# Patient Record
Sex: MALE | Race: WHITE | NOT HISPANIC OR LATINO
[De-identification: names, ages, dates, MRNs, and addresses within clinical notes are randomized per-mention and may not be internally consistent; named-entity substitution may affect disease eponyms.]

---

## 2017-07-26 ENCOUNTER — LABORATORY RESULT (OUTPATIENT)
Age: 62
End: 2017-07-26

## 2017-07-26 ENCOUNTER — APPOINTMENT (OUTPATIENT)
Dept: INFECTIOUS DISEASE | Facility: CLINIC | Age: 62
End: 2017-07-26
Payer: COMMERCIAL

## 2017-07-26 VITALS
WEIGHT: 200 LBS | HEART RATE: 76 BPM | TEMPERATURE: 98.2 F | OXYGEN SATURATION: 97 % | HEIGHT: 71 IN | BODY MASS INDEX: 28 KG/M2 | SYSTOLIC BLOOD PRESSURE: 137 MMHG | DIASTOLIC BLOOD PRESSURE: 91 MMHG

## 2017-07-26 PROCEDURE — G0010: CPT

## 2017-07-26 PROCEDURE — 99215 OFFICE O/P EST HI 40 MIN: CPT | Mod: 25

## 2017-07-26 PROCEDURE — 90740 HEPB VACC 3 DOSE IMMUNSUP IM: CPT

## 2017-07-26 RX ORDER — ASPIRIN 81 MG
81 TABLET,CHEWABLE ORAL
Refills: 0 | Status: ACTIVE | COMMUNITY

## 2017-07-27 LAB
25(OH)D3 SERPL-MCNC: 28.4 NG/ML
ALBUMIN SERPL ELPH-MCNC: 4.7 G/DL
ALP BLD-CCNC: 81 U/L
ALT SERPL-CCNC: 20 U/L
ANION GAP SERPL CALC-SCNC: 15 MMOL/L
APPEARANCE: CLEAR
AST SERPL-CCNC: 21 U/L
BACTERIA: NEGATIVE
BASOPHILS # BLD AUTO: 0.02 K/UL
BASOPHILS NFR BLD AUTO: 0.2 %
BILIRUB SERPL-MCNC: 0.4 MG/DL
BILIRUBIN URINE: NEGATIVE
BLOOD URINE: NEGATIVE
BUN SERPL-MCNC: 18 MG/DL
CALCIUM SERPL-MCNC: 9.8 MG/DL
CHLORIDE SERPL-SCNC: 102 MMOL/L
CHOLEST SERPL-MCNC: 221 MG/DL
CHOLEST/HDLC SERPL: 4.6 RATIO
CO2 SERPL-SCNC: 25 MMOL/L
COLOR: YELLOW
CREAT SERPL-MCNC: 1.23 MG/DL
EOSINOPHIL # BLD AUTO: 0.11 K/UL
EOSINOPHIL NFR BLD AUTO: 1.4 %
GLUCOSE QUALITATIVE U: NORMAL MG/DL
GLUCOSE SERPL-MCNC: 97 MG/DL
HBA1C MFR BLD HPLC: 6 %
HCT VFR BLD CALC: 42.5 %
HCV AB SER QL: NONREACTIVE
HCV S/CO RATIO: 0.12 S/CO
HDLC SERPL-MCNC: 48 MG/DL
HGB BLD-MCNC: 13.9 G/DL
HYALINE CASTS: 1 /LPF
IMM GRANULOCYTES NFR BLD AUTO: 0.2 %
KETONES URINE: NEGATIVE
LDLC SERPL CALC-MCNC: 152 MG/DL
LEUKOCYTE ESTERASE URINE: NEGATIVE
LYMPHOCYTES # BLD AUTO: 2.52 K/UL
LYMPHOCYTES NFR BLD AUTO: 31.3 %
MAN DIFF?: NORMAL
MCHC RBC-ENTMCNC: 29.5 PG
MCHC RBC-ENTMCNC: 32.7 GM/DL
MCV RBC AUTO: 90.2 FL
MICROSCOPIC-UA: NORMAL
MONOCYTES # BLD AUTO: 0.71 K/UL
MONOCYTES NFR BLD AUTO: 8.8 %
NEUTROPHILS # BLD AUTO: 4.67 K/UL
NEUTROPHILS NFR BLD AUTO: 58.1 %
NITRITE URINE: NEGATIVE
PH URINE: 6
PLATELET # BLD AUTO: 251 K/UL
POTASSIUM SERPL-SCNC: 5.1 MMOL/L
PROT SERPL-MCNC: 7.7 G/DL
PROTEIN URINE: NEGATIVE MG/DL
PSA SERPL-MCNC: 0.62 NG/ML
RBC # BLD: 4.71 M/UL
RBC # FLD: 13.9 %
RED BLOOD CELLS URINE: 1 /HPF
SODIUM SERPL-SCNC: 142 MMOL/L
SPECIFIC GRAVITY URINE: 1.03
SQUAMOUS EPITHELIAL CELLS: 0 /HPF
TRIGL SERPL-MCNC: 104 MG/DL
UROBILINOGEN URINE: NORMAL MG/DL
WBC # FLD AUTO: 8.05 K/UL
WHITE BLOOD CELLS URINE: 1 /HPF

## 2017-07-28 LAB
ADJUSTED MITOGEN: >10 IU/ML
ADJUSTED TB AG: 0 IU/ML
C TRACH RRNA SPEC QL NAA+PROBE: NORMAL
CD3 CELLS # BLD: 2045 /UL
CD3 CELLS NFR BLD: 79 %
CD3+CD4+ CELLS # BLD: 823 /UL
CD3+CD4+ CELLS NFR BLD: 32 %
CD3+CD4+ CELLS/CD3+CD8+ CLL SPEC: 0.64 RATIO
CD3+CD8+ CELLS # SPEC: 1290 /UL
CD3+CD8+ CELLS NFR BLD: 50 %
HIV1 RNA # SERPL NAA+PROBE: 32
HIV1 RNA # SERPL NAA+PROBE: ABNORMAL
M TB IFN-G BLD-IMP: NEGATIVE
N GONORRHOEA RRNA SPEC QL NAA+PROBE: NORMAL
QUANTIFERON GOLD NIL: 0.03 IU/ML
RPR SER-TITR: NORMAL
SOURCE AMPLIFICATION: NORMAL
VIRAL LOAD INTERP: NORMAL
VIRAL LOAD LOG: 1.5

## 2017-07-29 LAB
TESTOST BND SERPL-MCNC: 6.7 PG/ML
TESTOST SERPL-MCNC: 591.4 NG/DL

## 2017-08-04 ENCOUNTER — LABORATORY RESULT (OUTPATIENT)
Age: 62
End: 2017-08-04

## 2017-08-04 ENCOUNTER — APPOINTMENT (OUTPATIENT)
Dept: INFECTIOUS DISEASE | Facility: CLINIC | Age: 62
End: 2017-08-04
Payer: COMMERCIAL

## 2017-08-04 VITALS
BODY MASS INDEX: 28 KG/M2 | DIASTOLIC BLOOD PRESSURE: 86 MMHG | HEIGHT: 71 IN | WEIGHT: 200 LBS | TEMPERATURE: 97.6 F | SYSTOLIC BLOOD PRESSURE: 136 MMHG | OXYGEN SATURATION: 96 % | HEART RATE: 87 BPM | RESPIRATION RATE: 16 BRPM

## 2017-08-04 PROCEDURE — 99214 OFFICE O/P EST MOD 30 MIN: CPT

## 2017-08-07 LAB
ALBUMIN SERPL ELPH-MCNC: 4.3 G/DL
ALP BLD-CCNC: 69 U/L
ALT SERPL-CCNC: 15 U/L
ANION GAP SERPL CALC-SCNC: 12 MMOL/L
APPEARANCE: CLEAR
AST SERPL-CCNC: 21 U/L
BACTERIA UR CULT: NORMAL
BACTERIA: NEGATIVE
BASOPHILS # BLD AUTO: 0.02 K/UL
BASOPHILS NFR BLD AUTO: 0.2 %
BILIRUB SERPL-MCNC: 0.2 MG/DL
BILIRUBIN URINE: NEGATIVE
BLOOD URINE: NEGATIVE
BUN SERPL-MCNC: 22 MG/DL
C TRACH RRNA SPEC QL NAA+PROBE: NORMAL
CALCIUM SERPL-MCNC: 9 MG/DL
CHLORIDE SERPL-SCNC: 103 MMOL/L
CO2 SERPL-SCNC: 25 MMOL/L
COLOR: YELLOW
CREAT SERPL-MCNC: 1.24 MG/DL
EOSINOPHIL # BLD AUTO: 0.14 K/UL
EOSINOPHIL NFR BLD AUTO: 1.6 %
GLUCOSE QUALITATIVE U: NORMAL MG/DL
GLUCOSE SERPL-MCNC: 101 MG/DL
HCT VFR BLD CALC: 39.3 %
HGB BLD-MCNC: 13 G/DL
HYALINE CASTS: 4 /LPF
IMM GRANULOCYTES NFR BLD AUTO: 0.1 %
KETONES URINE: NEGATIVE
LEUKOCYTE ESTERASE URINE: NEGATIVE
LYMPHOCYTES # BLD AUTO: 2.67 K/UL
LYMPHOCYTES NFR BLD AUTO: 31.4 %
MAN DIFF?: NORMAL
MCHC RBC-ENTMCNC: 29.6 PG
MCHC RBC-ENTMCNC: 33.1 GM/DL
MCV RBC AUTO: 89.5 FL
MICROSCOPIC-UA: NORMAL
MONOCYTES # BLD AUTO: 0.72 K/UL
MONOCYTES NFR BLD AUTO: 8.5 %
N GONORRHOEA RRNA SPEC QL NAA+PROBE: NORMAL
NEUTROPHILS # BLD AUTO: 4.93 K/UL
NEUTROPHILS NFR BLD AUTO: 58.2 %
NITRITE URINE: NEGATIVE
PH URINE: 5.5
PLATELET # BLD AUTO: 252 K/UL
POTASSIUM SERPL-SCNC: 4.4 MMOL/L
PROT SERPL-MCNC: 7.7 G/DL
PROTEIN URINE: NEGATIVE MG/DL
RBC # BLD: 4.39 M/UL
RBC # FLD: 13.9 %
RED BLOOD CELLS URINE: 2 /HPF
SODIUM SERPL-SCNC: 140 MMOL/L
SOURCE AMPLIFICATION: NORMAL
SPECIFIC GRAVITY URINE: 1.03
SQUAMOUS EPITHELIAL CELLS: 2 /HPF
T PALLIDUM AB SER QL IA: POSITIVE
UROBILINOGEN URINE: NORMAL MG/DL
WBC # FLD AUTO: 8.49 K/UL
WHITE BLOOD CELLS URINE: 2 /HPF

## 2018-06-19 ENCOUNTER — APPOINTMENT (OUTPATIENT)
Dept: INFECTIOUS DISEASE | Facility: CLINIC | Age: 63
End: 2018-06-19

## 2018-06-19 ENCOUNTER — LABORATORY RESULT (OUTPATIENT)
Age: 63
End: 2018-06-19

## 2018-06-19 LAB
25(OH)D3 SERPL-MCNC: 28.9 NG/ML
ALBUMIN SERPL ELPH-MCNC: 3.9 G/DL
ALP BLD-CCNC: 62 U/L
ALT SERPL-CCNC: 19 U/L
ANION GAP SERPL CALC-SCNC: 14 MMOL/L
APPEARANCE: CLEAR
AST SERPL-CCNC: 23 U/L
BACTERIA: NEGATIVE
BASOPHILS # BLD AUTO: 0.02 K/UL
BASOPHILS NFR BLD AUTO: 0.3 %
BILIRUB SERPL-MCNC: 0.3 MG/DL
BILIRUBIN URINE: NEGATIVE
BLOOD URINE: NEGATIVE
BUN SERPL-MCNC: 19 MG/DL
CALCIUM SERPL-MCNC: 9.2 MG/DL
CD3 CELLS # BLD: 2226 /UL
CD3 CELLS NFR BLD: 77 %
CD3+CD4+ CELLS # BLD: 866 /UL
CD3+CD4+ CELLS NFR BLD: 30 %
CD3+CD4+ CELLS/CD3+CD8+ CLL SPEC: 0.61 RATIO
CD3+CD8+ CELLS # SPEC: 1428 /UL
CD3+CD8+ CELLS NFR BLD: 50 %
CHLORIDE SERPL-SCNC: 105 MMOL/L
CHOLEST SERPL-MCNC: 174 MG/DL
CHOLEST/HDLC SERPL: 4.2 RATIO
CO2 SERPL-SCNC: 24 MMOL/L
COLOR: YELLOW
CREAT SERPL-MCNC: 1.34 MG/DL
CREAT SPEC-SCNC: 86 MG/DL
CREAT/PROT UR: 0 RATIO
EOSINOPHIL # BLD AUTO: 0.1 K/UL
EOSINOPHIL NFR BLD AUTO: 1.5 %
GLUCOSE QUALITATIVE U: NEGATIVE MG/DL
GLUCOSE SERPL-MCNC: 121 MG/DL
HBA1C MFR BLD HPLC: 6.3 %
HCT VFR BLD CALC: 37.7 %
HDLC SERPL-MCNC: 41 MG/DL
HGB BLD-MCNC: 12.3 G/DL
IMM GRANULOCYTES NFR BLD AUTO: 0.1 %
KETONES URINE: NEGATIVE
LDLC SERPL CALC-MCNC: 118 MG/DL
LEUKOCYTE ESTERASE URINE: NEGATIVE
LYMPHOCYTES # BLD AUTO: 2.6 K/UL
LYMPHOCYTES NFR BLD AUTO: 38.6 %
MAN DIFF?: NORMAL
MCHC RBC-ENTMCNC: 29.4 PG
MCHC RBC-ENTMCNC: 32.6 GM/DL
MCV RBC AUTO: 90 FL
MICROSCOPIC-UA: NORMAL
MONOCYTES # BLD AUTO: 0.62 K/UL
MONOCYTES NFR BLD AUTO: 9.2 %
NEUTROPHILS # BLD AUTO: 3.38 K/UL
NEUTROPHILS NFR BLD AUTO: 50.3 %
NITRITE URINE: NEGATIVE
PH URINE: 7
PHOSPHATE SERPL-MCNC: 2.7 MG/DL
PLATELET # BLD AUTO: 231 K/UL
POTASSIUM SERPL-SCNC: 4.5 MMOL/L
PROT SERPL-MCNC: 6.8 G/DL
PROT UR-MCNC: 4 MG/DL
PROTEIN URINE: NEGATIVE MG/DL
PSA SERPL-MCNC: 0.43 NG/ML
RBC # BLD: 4.19 M/UL
RBC # FLD: 13.8 %
RED BLOOD CELLS URINE: 1 /HPF
SODIUM SERPL-SCNC: 143 MMOL/L
SPECIFIC GRAVITY URINE: 1.02
SQUAMOUS EPITHELIAL CELLS: 0 /HPF
TRIGL SERPL-MCNC: 74 MG/DL
UROBILINOGEN URINE: NEGATIVE MG/DL
WBC # FLD AUTO: 6.73 K/UL
WHITE BLOOD CELLS URINE: 0 /HPF

## 2018-06-20 LAB
C TRACH RRNA SPEC QL NAA+PROBE: NOT DETECTED
HBV SURFACE AB SER QL: REACTIVE
HCV AB SER QL: NONREACTIVE
HCV S/CO RATIO: 0.15 S/CO
HIV1 RNA # SERPL NAA+PROBE: 111
HIV1 RNA # SERPL NAA+PROBE: ABNORMAL
N GONORRHOEA RRNA SPEC QL NAA+PROBE: NOT DETECTED
SOURCE AMPLIFICATION: NORMAL
VIRAL LOAD INTERP: NORMAL
VIRAL LOAD LOG: 2.05

## 2018-06-21 LAB
ADJUSTED MITOGEN: 9.45 IU/ML
ADJUSTED TB AG: 0.02 IU/ML
M TB IFN-G BLD-IMP: NEGATIVE
QUANTIFERON GOLD NIL: 0.03 IU/ML

## 2018-06-22 LAB
T PALLIDUM AB SER QL IA: POSITIVE
TESTOST BND SERPL-MCNC: 6.5 PG/ML
TESTOST SERPL-MCNC: 415.4 NG/DL

## 2018-06-27 ENCOUNTER — APPOINTMENT (OUTPATIENT)
Dept: INFECTIOUS DISEASE | Facility: CLINIC | Age: 63
End: 2018-06-27
Payer: COMMERCIAL

## 2018-06-27 VITALS
DIASTOLIC BLOOD PRESSURE: 78 MMHG | TEMPERATURE: 97.6 F | OXYGEN SATURATION: 96 % | WEIGHT: 206 LBS | HEIGHT: 71 IN | SYSTOLIC BLOOD PRESSURE: 124 MMHG | HEART RATE: 82 BPM | BODY MASS INDEX: 28.84 KG/M2

## 2018-06-27 PROCEDURE — 99215 OFFICE O/P EST HI 40 MIN: CPT

## 2018-06-28 LAB
FOLATE SERPL-MCNC: 5.4 NG/ML
VIT B12 SERPL-MCNC: 626 PG/ML

## 2018-07-02 LAB
ABACAVIR ISLT GENOTYP: NORMAL
ATAZANAVIR+RITONAVIR ISLT GENOTYP: NORMAL
DARUNAVIR+RITONAVIR ISLT GENOTYP: NORMAL
DIDANOSINE ISLT GENOTYP: NORMAL
EFAVIRENZ ISLT GENOTYP: NORMAL
EMTRICITABINE ISLT GENOTYP: NORMAL
ETRAVIRINE ISLT GENOTYP: NORMAL
FOSAMPRENAVIR+RITONAVIR ISLT GENOTYP: NORMAL
HIV NNRTI GENE MUT DET ISLT: NORMAL
HIV NRTI GENE MUT DET ISLT: NORMAL
HIV PI GENE MUT DET ISLT: NORMAL
HIV RT GENE MUT DET ISLT: NORMAL
INDINAVIR+RITONAVIR ISLT GENOTYP: NORMAL
LAMIVUDINE ISLT GENOTYP: NORMAL
LOPINAVIR+RITONAVIR ISLT GENOTYP: NORMAL
NELFINAVIR ISLT GENOTYP: NORMAL
NEVIRAPINE ISLT GENOTYP: NORMAL
RILPIVIRINE ISLT GENOTYP: NORMAL
SAQUINAVIR+RITONAVIR ISLT GENOTYP: NORMAL
STAVUDINE ISLT GENOTYP: NORMAL
TENOFOVIR ISLT GENOTYP: NORMAL
TIPRANAVIR+RITONAVIR ISLT GENOTYP: NORMAL
ZIDOVUDINE ISLT GENOTYP: NORMAL

## 2018-07-10 LAB — HLA-B*57:01 QL: NEGATIVE

## 2018-07-18 LAB
MISCELLANEOUS TEST: NORMAL
PROC NAME: NORMAL

## 2018-07-25 ENCOUNTER — APPOINTMENT (OUTPATIENT)
Dept: INFECTIOUS DISEASE | Facility: CLINIC | Age: 63
End: 2018-07-25
Payer: COMMERCIAL

## 2018-07-25 VITALS
BODY MASS INDEX: 28.7 KG/M2 | DIASTOLIC BLOOD PRESSURE: 73 MMHG | TEMPERATURE: 97.2 F | HEIGHT: 71 IN | OXYGEN SATURATION: 97 % | SYSTOLIC BLOOD PRESSURE: 116 MMHG | HEART RATE: 90 BPM | WEIGHT: 205 LBS

## 2018-07-25 PROCEDURE — 99214 OFFICE O/P EST MOD 30 MIN: CPT

## 2018-07-27 LAB
ALBUMIN SERPL ELPH-MCNC: 4 G/DL
ALP BLD-CCNC: 60 U/L
ALT SERPL-CCNC: 15 U/L
AMYLASE/CREAT SERPL: 104 U/L
ANION GAP SERPL CALC-SCNC: 13 MMOL/L
AST SERPL-CCNC: 26 U/L
BASOPHILS # BLD AUTO: 0.03 K/UL
BASOPHILS NFR BLD AUTO: 0.4 %
BILIRUB SERPL-MCNC: 0.2 MG/DL
BUN SERPL-MCNC: 19 MG/DL
CALCIUM SERPL-MCNC: 9.2 MG/DL
CHLORIDE SERPL-SCNC: 103 MMOL/L
CO2 SERPL-SCNC: 26 MMOL/L
CREAT SERPL-MCNC: 1.13 MG/DL
EOSINOPHIL # BLD AUTO: 0.13 K/UL
EOSINOPHIL NFR BLD AUTO: 1.8 %
GLUCOSE SERPL-MCNC: 74 MG/DL
HCT VFR BLD CALC: 39 %
HGB BLD-MCNC: 12.5 G/DL
IMM GRANULOCYTES NFR BLD AUTO: 0.1 %
LPL SERPL-CCNC: 58 U/L
LYMPHOCYTES # BLD AUTO: 2.35 K/UL
LYMPHOCYTES NFR BLD AUTO: 33.3 %
MAN DIFF?: NORMAL
MCHC RBC-ENTMCNC: 29.2 PG
MCHC RBC-ENTMCNC: 32.1 GM/DL
MCV RBC AUTO: 91.1 FL
MONOCYTES # BLD AUTO: 0.58 K/UL
MONOCYTES NFR BLD AUTO: 8.2 %
NEUTROPHILS # BLD AUTO: 3.96 K/UL
NEUTROPHILS NFR BLD AUTO: 56.2 %
PLATELET # BLD AUTO: 248 K/UL
POTASSIUM SERPL-SCNC: 4.3 MMOL/L
PROT SERPL-MCNC: 7 G/DL
RBC # BLD: 4.28 M/UL
RBC # FLD: 13.8 %
SODIUM SERPL-SCNC: 142 MMOL/L
WBC # FLD AUTO: 7.06 K/UL

## 2018-08-02 LAB
DOLUTEGRAVIR RESISTANCE: NORMAL
ELVITEGRAVIR RESISTANCE: NORMAL
RALTEGRAVIR RESISTANCE: NORMAL

## 2018-08-06 LAB
HIV GENOSURE ARCHIVE 1: NORMAL
HIV1 PROVIR DNA RT + PR + IN MUT DET SEQ: NORMAL
HIV1 PROVIRAL DNA GENTYP BLD MC NAR: NORMAL

## 2018-08-14 ENCOUNTER — APPOINTMENT (OUTPATIENT)
Dept: UROLOGY | Facility: CLINIC | Age: 63
End: 2018-08-14
Payer: COMMERCIAL

## 2018-08-14 VITALS
SYSTOLIC BLOOD PRESSURE: 105 MMHG | HEIGHT: 71 IN | TEMPERATURE: 98.3 F | BODY MASS INDEX: 28.56 KG/M2 | DIASTOLIC BLOOD PRESSURE: 61 MMHG | HEART RATE: 43 BPM | WEIGHT: 204 LBS

## 2018-08-14 PROCEDURE — 99204 OFFICE O/P NEW MOD 45 MIN: CPT

## 2018-08-15 ENCOUNTER — MEDICATION RENEWAL (OUTPATIENT)
Age: 63
End: 2018-08-15

## 2018-09-24 ENCOUNTER — APPOINTMENT (OUTPATIENT)
Dept: INFECTIOUS DISEASE | Facility: CLINIC | Age: 63
End: 2018-09-24

## 2018-10-24 ENCOUNTER — LABORATORY RESULT (OUTPATIENT)
Age: 63
End: 2018-10-24

## 2018-10-24 ENCOUNTER — APPOINTMENT (OUTPATIENT)
Dept: INFECTIOUS DISEASE | Facility: CLINIC | Age: 63
End: 2018-10-24
Payer: COMMERCIAL

## 2018-10-24 VITALS
TEMPERATURE: 97.7 F | WEIGHT: 204 LBS | SYSTOLIC BLOOD PRESSURE: 125 MMHG | BODY MASS INDEX: 28.45 KG/M2 | HEART RATE: 94 BPM | DIASTOLIC BLOOD PRESSURE: 82 MMHG | OXYGEN SATURATION: 94 %

## 2018-10-24 LAB
25(OH)D3 SERPL-MCNC: 26 NG/ML
ALBUMIN SERPL ELPH-MCNC: 4.6 G/DL
ALP BLD-CCNC: 83 U/L
ALT SERPL-CCNC: 17 U/L
ANION GAP SERPL CALC-SCNC: 13 MMOL/L
APPEARANCE: CLEAR
AST SERPL-CCNC: 18 U/L
BACTERIA: NEGATIVE
BASOPHILS # BLD AUTO: 0.02 K/UL
BASOPHILS NFR BLD AUTO: 0.2 %
BILIRUB SERPL-MCNC: 0.3 MG/DL
BILIRUBIN URINE: NEGATIVE
BLOOD URINE: NEGATIVE
BUN SERPL-MCNC: 16 MG/DL
CALCIUM SERPL-MCNC: 9.6 MG/DL
CD3 CELLS # BLD: 2072 /UL
CD3 CELLS NFR BLD: 75 %
CD3+CD4+ CELLS # BLD: 901 /UL
CD3+CD4+ CELLS NFR BLD: 33 %
CD3+CD4+ CELLS/CD3+CD8+ CLL SPEC: 0.78 RATIO
CD3+CD8+ CELLS # SPEC: 1155 /UL
CD3+CD8+ CELLS NFR BLD: 42 %
CHLORIDE SERPL-SCNC: 104 MMOL/L
CHOLEST SERPL-MCNC: 205 MG/DL
CHOLEST/HDLC SERPL: 4.2 RATIO
CO2 SERPL-SCNC: 26 MMOL/L
COLOR: YELLOW
CREAT SERPL-MCNC: 1.28 MG/DL
EOSINOPHIL # BLD AUTO: 0.13 K/UL
EOSINOPHIL NFR BLD AUTO: 1.5 %
GLUCOSE QUALITATIVE U: NEGATIVE MG/DL
GLUCOSE SERPL-MCNC: 96 MG/DL
HBA1C MFR BLD HPLC: 6.2 %
HCT VFR BLD CALC: 40.5 %
HDLC SERPL-MCNC: 49 MG/DL
HGB BLD-MCNC: 13.2 G/DL
HYALINE CASTS: 0 /LPF
IMM GRANULOCYTES NFR BLD AUTO: 0.2 %
KETONES URINE: NEGATIVE
LDLC SERPL CALC-MCNC: 146 MG/DL
LEUKOCYTE ESTERASE URINE: NEGATIVE
LYMPHOCYTES # BLD AUTO: 2.67 K/UL
LYMPHOCYTES NFR BLD AUTO: 30.5 %
MAN DIFF?: NORMAL
MCHC RBC-ENTMCNC: 28.8 PG
MCHC RBC-ENTMCNC: 32.6 GM/DL
MCV RBC AUTO: 88.4 FL
MICROSCOPIC-UA: NORMAL
MONOCYTES # BLD AUTO: 0.8 K/UL
MONOCYTES NFR BLD AUTO: 9.1 %
NEUTROPHILS # BLD AUTO: 5.11 K/UL
NEUTROPHILS NFR BLD AUTO: 58.5 %
NITRITE URINE: NEGATIVE
PH URINE: 5
PLATELET # BLD AUTO: 266 K/UL
POTASSIUM SERPL-SCNC: 4.2 MMOL/L
PROT SERPL-MCNC: 7.7 G/DL
PROTEIN URINE: NEGATIVE MG/DL
RBC # BLD: 4.58 M/UL
RBC # FLD: 14 %
RED BLOOD CELLS URINE: 1 /HPF
SODIUM SERPL-SCNC: 143 MMOL/L
SPECIFIC GRAVITY URINE: 1.03
SQUAMOUS EPITHELIAL CELLS: 0 /HPF
TRIGL SERPL-MCNC: 51 MG/DL
UROBILINOGEN URINE: NEGATIVE MG/DL
WBC # FLD AUTO: 8.75 K/UL
WHITE BLOOD CELLS URINE: 1 /HPF

## 2018-10-24 PROCEDURE — 99214 OFFICE O/P EST MOD 30 MIN: CPT

## 2018-10-26 LAB
C TRACH RRNA SPEC QL NAA+PROBE: NOT DETECTED
HIV1 RNA # SERPL NAA+PROBE: 170
HIV1 RNA # SERPL NAA+PROBE: ABNORMAL
M TB IFN-G BLD-IMP: NEGATIVE
N GONORRHOEA RRNA SPEC QL NAA+PROBE: NOT DETECTED
QUANTIFERON TB PLUS MITOGEN MINUS NIL: >10 IU/ML
QUANTIFERON TB PLUS NIL: 0.06 IU/ML
QUANTIFERON TB PLUS TB1 MINUS NIL: -0.02 IU/ML
QUANTIFERON TB PLUS TB2 MINUS NIL: -0.02 IU/ML
SOURCE AMPLIFICATION: NORMAL
VIRAL LOAD INTERP: NORMAL
VIRAL LOAD LOG: 2.23

## 2018-10-27 LAB — T PALLIDUM AB SER QL IA: POSITIVE

## 2018-10-30 LAB — E HISTOLYT AB SER-ACNC: NEGATIVE

## 2019-01-11 ENCOUNTER — APPOINTMENT (OUTPATIENT)
Dept: UROLOGY | Facility: CLINIC | Age: 64
End: 2019-01-11

## 2019-10-14 ENCOUNTER — APPOINTMENT (OUTPATIENT)
Dept: INFECTIOUS DISEASE | Facility: CLINIC | Age: 64
End: 2019-10-14

## 2019-10-14 ENCOUNTER — LABORATORY RESULT (OUTPATIENT)
Age: 64
End: 2019-10-14

## 2019-10-17 LAB
25(OH)D3 SERPL-MCNC: 23.3 NG/ML
ALBUMIN SERPL ELPH-MCNC: 4.2 G/DL
ALP BLD-CCNC: 70 U/L
ALT SERPL-CCNC: 11 U/L
ANION GAP SERPL CALC-SCNC: 12 MMOL/L
APPEARANCE: ABNORMAL
AST SERPL-CCNC: 17 U/L
BACTERIA: NEGATIVE
BASOPHILS # BLD AUTO: 0.05 K/UL
BASOPHILS NFR BLD AUTO: 0.8 %
BILIRUB SERPL-MCNC: 0.4 MG/DL
BILIRUBIN URINE: NEGATIVE
BLOOD URINE: NEGATIVE
BUN SERPL-MCNC: 15 MG/DL
C TRACH RRNA SPEC QL NAA+PROBE: NOT DETECTED
CALCIUM SERPL-MCNC: 9.1 MG/DL
CD3 CELLS # BLD: 1475 /UL
CD3 CELLS NFR BLD: 79 %
CD3+CD4+ CELLS # BLD: 603 /UL
CD3+CD4+ CELLS NFR BLD: 32 %
CD3+CD4+ CELLS/CD3+CD8+ CLL SPEC: 0.7 RATIO
CD3+CD8+ CELLS # SPEC: 860 /UL
CD3+CD8+ CELLS NFR BLD: 46 %
CHLORIDE SERPL-SCNC: 106 MMOL/L
CHOLEST SERPL-MCNC: 188 MG/DL
CHOLEST/HDLC SERPL: 4.7 RATIO
CO2 SERPL-SCNC: 23 MMOL/L
COLOR: NORMAL
CREAT SERPL-MCNC: 1.28 MG/DL
CREAT SPEC-SCNC: 114 MG/DL
CREAT/PROT UR: 0.1 RATIO
EOSINOPHIL # BLD AUTO: 0.15 K/UL
EOSINOPHIL NFR BLD AUTO: 2.4 %
ESTIMATED AVERAGE GLUCOSE: 131 MG/DL
GLUCOSE QUALITATIVE U: NEGATIVE
GLUCOSE SERPL-MCNC: 103 MG/DL
HBA1C MFR BLD HPLC: 6.2 %
HBV SURFACE AB SER QL: REACTIVE
HCT VFR BLD CALC: 39.7 %
HCV RNA SERPL NAA DL=5-ACNC: NOT DETECTED IU/ML
HCV RNA SERPL NAA+PROBE-LOG IU: NOT DETECTED LOGIU/ML
HDLC SERPL-MCNC: 40 MG/DL
HGB BLD-MCNC: 12.8 G/DL
HIV1 RNA # SERPL NAA+PROBE: 56
HIV1 RNA # SERPL NAA+PROBE: ABNORMAL
HYALINE CASTS: 0 /LPF
IMM GRANULOCYTES NFR BLD AUTO: 0 %
KETONES URINE: NEGATIVE
LDLC SERPL CALC-MCNC: 130 MG/DL
LEUKOCYTE ESTERASE URINE: NEGATIVE
LYMPHOCYTES # BLD AUTO: 2.02 K/UL
LYMPHOCYTES NFR BLD AUTO: 32.2 %
M TB IFN-G BLD-IMP: NEGATIVE
MAN DIFF?: NORMAL
MCHC RBC-ENTMCNC: 30.4 PG
MCHC RBC-ENTMCNC: 32.2 GM/DL
MCV RBC AUTO: 94.3 FL
MICROSCOPIC-UA: NORMAL
MONOCYTES # BLD AUTO: 0.55 K/UL
MONOCYTES NFR BLD AUTO: 8.8 %
N GONORRHOEA RRNA SPEC QL NAA+PROBE: NOT DETECTED
NEUTROPHILS # BLD AUTO: 3.51 K/UL
NEUTROPHILS NFR BLD AUTO: 55.8 %
NITRITE URINE: NEGATIVE
PH URINE: 8
PHOSPHATE SERPL-MCNC: 2.8 MG/DL
PLATELET # BLD AUTO: 230 K/UL
POTASSIUM SERPL-SCNC: 4.5 MMOL/L
PROT SERPL-MCNC: 6.5 G/DL
PROT UR-MCNC: 6 MG/DL
PROTEIN URINE: NORMAL
PSA SERPL-MCNC: 0.6 NG/ML
QUANTIFERON TB PLUS MITOGEN MINUS NIL: >10 IU/ML
QUANTIFERON TB PLUS NIL: 0.02 IU/ML
QUANTIFERON TB PLUS TB1 MINUS NIL: 0 IU/ML
QUANTIFERON TB PLUS TB2 MINUS NIL: 0 IU/ML
RBC # BLD: 4.21 M/UL
RBC # FLD: 13.2 %
RED BLOOD CELLS URINE: 1 /HPF
SODIUM SERPL-SCNC: 141 MMOL/L
SOURCE AMPLIFICATION: NORMAL
SPECIFIC GRAVITY URINE: 1.02
SQUAMOUS EPITHELIAL CELLS: 0 /HPF
T PALLIDUM AB SER QL IA: POSITIVE
TESTOST BND SERPL-MCNC: 6.2 PG/ML
TESTOST SERPL-MCNC: 415.2 NG/DL
TRIGL SERPL-MCNC: 90 MG/DL
UROBILINOGEN URINE: NORMAL
VIRAL LOAD INTERP: NORMAL
VIRAL LOAD LOG: 1.75
WBC # FLD AUTO: 6.28 K/UL
WHITE BLOOD CELLS URINE: 0 /HPF

## 2019-10-22 ENCOUNTER — FORM ENCOUNTER (OUTPATIENT)
Age: 64
End: 2019-10-22

## 2019-10-23 ENCOUNTER — APPOINTMENT (OUTPATIENT)
Dept: INFECTIOUS DISEASE | Facility: CLINIC | Age: 64
End: 2019-10-23
Payer: MEDICARE

## 2019-10-23 VITALS
HEIGHT: 71 IN | BODY MASS INDEX: 28.28 KG/M2 | DIASTOLIC BLOOD PRESSURE: 79 MMHG | WEIGHT: 202 LBS | RESPIRATION RATE: 17 BRPM | HEART RATE: 89 BPM | TEMPERATURE: 97.1 F | OXYGEN SATURATION: 97 % | SYSTOLIC BLOOD PRESSURE: 118 MMHG

## 2019-10-23 DIAGNOSIS — L03.113 CELLULITIS OF RIGHT UPPER LIMB: ICD-10-CM

## 2019-10-23 DIAGNOSIS — K52.9 NONINFECTIVE GASTROENTERITIS AND COLITIS, UNSPECIFIED: ICD-10-CM

## 2019-10-23 PROCEDURE — 99215 OFFICE O/P EST HI 40 MIN: CPT

## 2019-10-25 LAB
HIV1 RNA # SERPL NAA+PROBE: 95
HIV1 RNA # SERPL NAA+PROBE: ABNORMAL
VIRAL LOAD INTERP: NORMAL
VIRAL LOAD LOG: 1.98

## 2019-10-27 PROBLEM — L03.113 CELLULITIS OF RIGHT UPPER EXTREMITY: Status: RESOLVED | Noted: 2018-10-27 | Resolved: 2019-10-27

## 2019-10-27 PROBLEM — K52.9 COLITIS, ACUTE: Status: RESOLVED | Noted: 2017-07-29 | Resolved: 2019-10-27

## 2019-11-02 NOTE — ADDENDUM
[FreeTextEntry1] : Reviewing patients record I see that he needs the flu shot and pneumovax\par I will have nurse call to check to see if he got it at the local pharmacy.\par will also need shingrix at some point but won't give together with other shts

## 2019-11-02 NOTE — REVIEW OF SYSTEMS
[Negative] : Heme/Lymph [FreeTextEntry2] : outside doctor gives patient Bactrim pills for when he has  a staph infection [FreeTextEntry4] : last eye exam- a long time ago   dentist - regularly [FreeTextEntry7] : last colonoscopy a  few years ago.. hemorrhoids  [de-identified] : claims missed none

## 2019-11-02 NOTE — ASSESSMENT
[FreeTextEntry1] : 63 yo male here for follow up- after hiatus\par \par \par Pt is not compliant with his prostate meds and was not with his diabetes meds. I suspect his HIV meds as well- although he claims he hasn't missed a pill - this concerns me because he is detectable . According to his resistance testing he should have responded to current treatment , unless there is some reason he isn't absorbing his pills.\par \par will follow up with urology\par \par pt upset about costs of meds\par \par thing in mouth - purplish-? [place]  send to oral surgeon\par \par Will repeat viral load and resistance testing

## 2019-11-02 NOTE — PHYSICAL EXAM
[General Appearance - Alert] : alert [General Appearance - In No Acute Distress] : in no acute distress [Sclera] : the sclera and conjunctiva were normal [PERRL With Normal Accommodation] : pupils were equal in size, round, reactive to light [Extraocular Movements] : extraocular movements were intact [Outer Ear] : the ears and nose were normal in appearance [Oropharynx] : the oropharynx was normal with no thrush [Neck Appearance] : the appearance of the neck was normal [Neck Cervical Mass (___cm)] : no neck mass was observed [Jugular Venous Distention Increased] : there was no jugular-venous distention [Thyroid Diffuse Enlargement] : the thyroid was not enlarged [Auscultation Breath Sounds / Voice Sounds] : lungs were clear to auscultation bilaterally [Heart Rate And Rhythm] : heart rate was normal and rhythm regular [Heart Sounds] : normal S1 and S2 [Heart Sounds Gallop] : no gallops [Murmurs] : no murmurs [Heart Sounds Pericardial Friction Rub] : no pericardial rub [Full Pulse] : the pedal pulses are present [Edema] : there was no peripheral edema [Bowel Sounds] : normal bowel sounds [Abdomen Soft] : soft [Abdomen Tenderness] : non-tender [Abdomen Mass (___ Cm)] : no abdominal mass palpated [Costovertebral Angle Tenderness] : no CVA tenderness [No Palpable Adenopathy] : no palpable adenopathy [Musculoskeletal - Swelling] : no joint swelling [Nail Clubbing] : no clubbing  or cyanosis of the fingernails [Motor Tone] : muscle strength and tone were normal [Skin Color & Pigmentation] : normal skin color and pigmentation [] : no rash [Deep Tendon Reflexes (DTR)] : deep tendon reflexes were 2+ and symmetric [Sensation] : the sensory exam was normal to light touch and pinprick [No Focal Deficits] : no focal deficits [Oriented To Time, Place, And Person] : oriented to person, place, and time [Affect] : the affect was normal [FreeTextEntry1] : lesion right buccal mucosa -purple

## 2019-11-02 NOTE — HISTORY OF PRESENT ILLNESS
[FreeTextEntry1] : pt claims he  hasn't missed a dose\par \par believes when stool isn't hard - then doesn't absorb meds\par \par having no night sweats\par Pt believes that when virus is higher in system sleeps a lot \par had some diarrhea illness when tool test so believes that’s why it was detectable\par \par not on meds for diabetes  careful with diet\par not exercising [de-identified] :  [de-identified] : single

## 2020-02-07 ENCOUNTER — APPOINTMENT (OUTPATIENT)
Dept: INFECTIOUS DISEASE | Facility: CLINIC | Age: 65
End: 2020-02-07

## 2020-02-07 ENCOUNTER — LABORATORY RESULT (OUTPATIENT)
Age: 65
End: 2020-02-07

## 2020-02-09 LAB
25(OH)D3 SERPL-MCNC: 16.9 NG/ML
ALBUMIN SERPL ELPH-MCNC: 4.4 G/DL
ALP BLD-CCNC: 73 U/L
ALT SERPL-CCNC: 14 U/L
ANION GAP SERPL CALC-SCNC: 12 MMOL/L
APPEARANCE: CLEAR
AST SERPL-CCNC: 13 U/L
BACTERIA: NEGATIVE
BASOPHILS # BLD AUTO: 0.05 K/UL
BASOPHILS NFR BLD AUTO: 0.8 %
BILIRUB SERPL-MCNC: 0.2 MG/DL
BILIRUBIN URINE: NEGATIVE
BLOOD URINE: NEGATIVE
BUN SERPL-MCNC: 16 MG/DL
C TRACH RRNA SPEC QL NAA+PROBE: NOT DETECTED
CALCIUM SERPL-MCNC: 9.4 MG/DL
CD3 CELLS # BLD: 1487 /UL
CD3 CELLS NFR BLD: 78 %
CD3+CD4+ CELLS # BLD: 623 /UL
CD3+CD4+ CELLS NFR BLD: 33 %
CD3+CD4+ CELLS/CD3+CD8+ CLL SPEC: 0.73 RATIO
CD3+CD8+ CELLS # SPEC: 858 /UL
CD3+CD8+ CELLS NFR BLD: 45 %
CHLORIDE SERPL-SCNC: 106 MMOL/L
CHOLEST SERPL-MCNC: 198 MG/DL
CHOLEST/HDLC SERPL: 4.2 RATIO
CO2 SERPL-SCNC: 25 MMOL/L
COLOR: YELLOW
CREAT SERPL-MCNC: 1.34 MG/DL
CREAT SPEC-SCNC: 255 MG/DL
CREAT/PROT UR: 0 RATIO
EOSINOPHIL # BLD AUTO: 0.13 K/UL
EOSINOPHIL NFR BLD AUTO: 2 %
ESTIMATED AVERAGE GLUCOSE: 131 MG/DL
GLUCOSE QUALITATIVE U: NEGATIVE
GLUCOSE SERPL-MCNC: 83 MG/DL
HBA1C MFR BLD HPLC: 6.2 %
HBV SURFACE AB SER QL: REACTIVE
HCT VFR BLD CALC: 39.3 %
HCV RNA SERPL NAA DL=5-ACNC: NOT DETECTED IU/ML
HCV RNA SERPL NAA+PROBE-LOG IU: NOT DETECTED LOG10IU/ML
HDLC SERPL-MCNC: 47 MG/DL
HGB BLD-MCNC: 12.7 G/DL
HIV1 RNA # SERPL NAA+PROBE: 63
HIV1 RNA # SERPL NAA+PROBE: ABNORMAL
HYALINE CASTS: 0 /LPF
IMM GRANULOCYTES NFR BLD AUTO: 0.3 %
KETONES URINE: NEGATIVE
LDLC SERPL CALC-MCNC: 139 MG/DL
LEUKOCYTE ESTERASE URINE: NEGATIVE
LYMPHOCYTES # BLD AUTO: 1.83 K/UL
LYMPHOCYTES NFR BLD AUTO: 27.6 %
MAN DIFF?: NORMAL
MCHC RBC-ENTMCNC: 30.3 PG
MCHC RBC-ENTMCNC: 32.3 GM/DL
MCV RBC AUTO: 93.8 FL
MICROSCOPIC-UA: NORMAL
MONOCYTES # BLD AUTO: 0.54 K/UL
MONOCYTES NFR BLD AUTO: 8.1 %
N GONORRHOEA RRNA SPEC QL NAA+PROBE: NOT DETECTED
NEUTROPHILS # BLD AUTO: 4.06 K/UL
NEUTROPHILS NFR BLD AUTO: 61.2 %
NITRITE URINE: NEGATIVE
PH URINE: 5.5
PHOSPHATE SERPL-MCNC: 2.9 MG/DL
PLATELET # BLD AUTO: 263 K/UL
POTASSIUM SERPL-SCNC: 4.5 MMOL/L
PROT SERPL-MCNC: 6.8 G/DL
PROT UR-MCNC: 11 MG/DL
PROTEIN URINE: NORMAL
PSA SERPL-MCNC: 0.57 NG/ML
RBC # BLD: 4.19 M/UL
RBC # FLD: 13.7 %
RED BLOOD CELLS URINE: 1 /HPF
SODIUM SERPL-SCNC: 143 MMOL/L
SOURCE AMPLIFICATION: NORMAL
SPECIFIC GRAVITY URINE: 1.03
SQUAMOUS EPITHELIAL CELLS: 0 /HPF
TRIGL SERPL-MCNC: 64 MG/DL
UROBILINOGEN URINE: NORMAL
VIRAL LOAD INTERP: NORMAL
VIRAL LOAD LOG: 1.8
WBC # FLD AUTO: 6.63 K/UL
WHITE BLOOD CELLS URINE: 1 /HPF

## 2020-02-11 LAB
M TB IFN-G BLD-IMP: NEGATIVE
QUANTIFERON TB PLUS MITOGEN MINUS NIL: 1.16 IU/ML
QUANTIFERON TB PLUS NIL: 0.03 IU/ML
QUANTIFERON TB PLUS TB1 MINUS NIL: 0 IU/ML
QUANTIFERON TB PLUS TB2 MINUS NIL: -0.03 IU/ML
RPR SER-TITR: NORMAL
T PALLIDUM AB SER DONR QL IF: REACTIVE
T PALLIDUM AB SER QL IA: POSITIVE
TESTOST BND SERPL-MCNC: 7.7 PG/ML
TESTOST SERPL-MCNC: 409.2 NG/DL

## 2020-02-12 ENCOUNTER — APPOINTMENT (OUTPATIENT)
Dept: INFECTIOUS DISEASE | Facility: CLINIC | Age: 65
End: 2020-02-12
Payer: MEDICARE

## 2020-02-12 VITALS
WEIGHT: 204 LBS | BODY MASS INDEX: 28.56 KG/M2 | SYSTOLIC BLOOD PRESSURE: 114 MMHG | HEIGHT: 71 IN | DIASTOLIC BLOOD PRESSURE: 74 MMHG | OXYGEN SATURATION: 97 % | HEART RATE: 82 BPM | RESPIRATION RATE: 16 BRPM | TEMPERATURE: 97.7 F

## 2020-02-12 DIAGNOSIS — H57.11 OCULAR PAIN, RIGHT EYE: ICD-10-CM

## 2020-02-12 DIAGNOSIS — K13.70 UNSPECIFIED LESIONS OF ORAL MUCOSA: ICD-10-CM

## 2020-02-12 PROCEDURE — 99215 OFFICE O/P EST HI 40 MIN: CPT

## 2020-02-13 LAB
HIV1 RNA # SERPL NAA+PROBE: 79
HIV1 RNA # SERPL NAA+PROBE: ABNORMAL
VIRAL LOAD INTERP: NORMAL
VIRAL LOAD LOG: 1.9

## 2020-02-16 PROBLEM — H57.11 PAIN OF RIGHT EYE: Status: ACTIVE | Noted: 2020-02-16

## 2020-02-16 PROBLEM — K13.70 ORAL MUCOSAL LESION: Status: ACTIVE | Noted: 2019-10-23

## 2020-06-08 ENCOUNTER — APPOINTMENT (OUTPATIENT)
Dept: INFECTIOUS DISEASE | Facility: CLINIC | Age: 65
End: 2020-06-08

## 2020-06-08 ENCOUNTER — LABORATORY RESULT (OUTPATIENT)
Age: 65
End: 2020-06-08

## 2020-06-10 LAB
25(OH)D3 SERPL-MCNC: 27.4 NG/ML
ALBUMIN SERPL ELPH-MCNC: 4.3 G/DL
ALP BLD-CCNC: 71 U/L
ALT SERPL-CCNC: 15 U/L
ANION GAP SERPL CALC-SCNC: 10 MMOL/L
APPEARANCE: CLEAR
AST SERPL-CCNC: 17 U/L
BACTERIA: ABNORMAL
BASOPHILS # BLD AUTO: 0.05 K/UL
BASOPHILS NFR BLD AUTO: 0.7 %
BILIRUB SERPL-MCNC: 0.3 MG/DL
BILIRUBIN URINE: NEGATIVE
BLOOD URINE: NEGATIVE
BUN SERPL-MCNC: 23 MG/DL
C TRACH RRNA SPEC QL NAA+PROBE: NOT DETECTED
CALCIUM OXALATE CRYSTALS: ABNORMAL
CALCIUM SERPL-MCNC: 9.4 MG/DL
CD3 CELLS # BLD: 1497 /UL
CD3 CELLS NFR BLD: 77 %
CD3+CD4+ CELLS # BLD: 643 /UL
CD3+CD4+ CELLS NFR BLD: 33 %
CD3+CD4+ CELLS/CD3+CD8+ CLL SPEC: 0.74 RATIO
CD3+CD8+ CELLS # SPEC: 863 /UL
CD3+CD8+ CELLS NFR BLD: 45 %
CHLORIDE SERPL-SCNC: 106 MMOL/L
CHOLEST SERPL-MCNC: 191 MG/DL
CHOLEST/HDLC SERPL: 4.7 RATIO
CO2 SERPL-SCNC: 26 MMOL/L
COLOR: YELLOW
CREAT SERPL-MCNC: 1.38 MG/DL
CREAT SPEC-SCNC: 371 MG/DL
CREAT/PROT UR: 0 RATIO
EOSINOPHIL # BLD AUTO: 0.1 K/UL
EOSINOPHIL NFR BLD AUTO: 1.4 %
ESTIMATED AVERAGE GLUCOSE: 131 MG/DL
GLUCOSE QUALITATIVE U: NEGATIVE
GLUCOSE SERPL-MCNC: 115 MG/DL
HBA1C MFR BLD HPLC: 6.2 %
HCT VFR BLD CALC: 40.2 %
HCV RNA SERPL NAA DL=5-ACNC: NOT DETECTED
HCV RNA SERPL NAA+PROBE-LOG IU: NOT DETECTED LOG10IU/ML
HDLC SERPL-MCNC: 40 MG/DL
HGB BLD-MCNC: 12.8 G/DL
HIV1 RNA # SERPL NAA+PROBE: 71
HIV1 RNA # SERPL NAA+PROBE: ABNORMAL
HYALINE CASTS: 0 /LPF
IMM GRANULOCYTES NFR BLD AUTO: 0.3 %
KETONES URINE: NORMAL
LDLC SERPL CALC-MCNC: 136 MG/DL
LEUKOCYTE ESTERASE URINE: NEGATIVE
LYMPHOCYTES # BLD AUTO: 2.07 K/UL
LYMPHOCYTES NFR BLD AUTO: 29.9 %
M TB IFN-G BLD-IMP: NEGATIVE
MAN DIFF?: NORMAL
MCHC RBC-ENTMCNC: 29.4 PG
MCHC RBC-ENTMCNC: 31.8 GM/DL
MCV RBC AUTO: 92.4 FL
MICROSCOPIC-UA: NORMAL
MONOCYTES # BLD AUTO: 0.66 K/UL
MONOCYTES NFR BLD AUTO: 9.5 %
N GONORRHOEA RRNA SPEC QL NAA+PROBE: NOT DETECTED
NEUTROPHILS # BLD AUTO: 4.02 K/UL
NEUTROPHILS NFR BLD AUTO: 58.2 %
NITRITE URINE: NEGATIVE
PH URINE: 6
PHOSPHATE SERPL-MCNC: 2.6 MG/DL
PLATELET # BLD AUTO: 246 K/UL
POTASSIUM SERPL-SCNC: 5.3 MMOL/L
PROT SERPL-MCNC: 6.7 G/DL
PROT UR-MCNC: 16 MG/DL
PROTEIN URINE: ABNORMAL
PSA SERPL-MCNC: 1.16 NG/ML
QUANTIFERON TB PLUS MITOGEN MINUS NIL: 8.71 IU/ML
QUANTIFERON TB PLUS NIL: 0.02 IU/ML
QUANTIFERON TB PLUS TB1 MINUS NIL: 0 IU/ML
QUANTIFERON TB PLUS TB2 MINUS NIL: 0 IU/ML
RBC # BLD: 4.35 M/UL
RBC # FLD: 13.7 %
RED BLOOD CELLS URINE: 1 /HPF
SARS-COV-2 IGG SERPL IA-ACNC: <3.8 AU/ML
SARS-COV-2 IGG SERPL QL IA: NEGATIVE
SODIUM SERPL-SCNC: 141 MMOL/L
SOURCE AMPLIFICATION: NORMAL
SPECIFIC GRAVITY URINE: 1.04
SQUAMOUS EPITHELIAL CELLS: 0 /HPF
TRIGL SERPL-MCNC: 72 MG/DL
URINE COMMENTS: NORMAL
UROBILINOGEN URINE: NORMAL
VIRAL LOAD INTERP: NORMAL
VIRAL LOAD LOG: 1.85
WBC # FLD AUTO: 6.92 K/UL
WHITE BLOOD CELLS URINE: 2 /HPF

## 2020-06-11 LAB — T PALLIDUM AB SER QL IA: POSITIVE

## 2020-06-14 LAB
TESTOST BND SERPL-MCNC: 10.2 PG/ML
TESTOST SERPL-MCNC: 633.8 NG/DL

## 2020-06-17 ENCOUNTER — APPOINTMENT (OUTPATIENT)
Dept: INFECTIOUS DISEASE | Facility: CLINIC | Age: 65
End: 2020-06-17
Payer: MEDICARE

## 2020-06-17 VITALS
SYSTOLIC BLOOD PRESSURE: 122 MMHG | TEMPERATURE: 98 F | HEIGHT: 71 IN | DIASTOLIC BLOOD PRESSURE: 79 MMHG | OXYGEN SATURATION: 97 % | WEIGHT: 200 LBS | BODY MASS INDEX: 28 KG/M2 | HEART RATE: 84 BPM

## 2020-06-17 DIAGNOSIS — L98.9 DISORDER OF THE SKIN AND SUBCUTANEOUS TISSUE, UNSPECIFIED: ICD-10-CM

## 2020-06-17 PROCEDURE — 99215 OFFICE O/P EST HI 40 MIN: CPT

## 2020-06-17 RX ORDER — SULFAMETHOXAZOLE AND TRIMETHOPRIM 400; 80 MG/1; MG/1
400-80 TABLET ORAL
Qty: 10 | Refills: 0 | Status: DISCONTINUED | COMMUNITY
Start: 2020-06-17 | End: 2020-06-17

## 2020-06-17 RX ORDER — FAMOTIDINE 20 MG/1
20 TABLET, FILM COATED ORAL TWICE DAILY
Qty: 60 | Refills: 0 | Status: DISCONTINUED | COMMUNITY
Start: 2020-06-17 | End: 2020-06-17

## 2020-06-18 LAB
HSV+VZV DNA SPEC QL NAA+PROBE: ABNORMAL
SPECIMEN SOURCE: NORMAL

## 2020-06-19 RX ORDER — ACYCLOVIR 50 MG/G
5 OINTMENT TOPICAL 3 TIMES DAILY
Qty: 1 | Refills: 0 | Status: ACTIVE | COMMUNITY
Start: 2020-06-19 | End: 1900-01-01

## 2020-06-19 RX ORDER — SULFAMETHOXAZOLE AND TRIMETHOPRIM 800; 160 MG/1; MG/1
TABLET ORAL
Refills: 0 | Status: DISCONTINUED | COMMUNITY
End: 2020-06-19

## 2020-06-20 PROBLEM — L98.9 SKIN LESIONS: Status: ACTIVE | Noted: 2020-06-17

## 2020-06-20 LAB — BACTERIA GENITAL AEROBE CULT: NORMAL

## 2020-07-01 ENCOUNTER — APPOINTMENT (OUTPATIENT)
Dept: INFECTIOUS DISEASE | Facility: CLINIC | Age: 65
End: 2020-07-01

## 2020-07-02 LAB
HIV1 RNA # SERPL NAA+PROBE: 97
HIV1 RNA # SERPL NAA+PROBE: ABNORMAL
VIRAL LOAD INTERP: NORMAL
VIRAL LOAD LOG: 1.99

## 2020-10-16 ENCOUNTER — APPOINTMENT (OUTPATIENT)
Dept: INFECTIOUS DISEASE | Facility: CLINIC | Age: 65
End: 2020-10-16

## 2020-10-16 ENCOUNTER — LABORATORY RESULT (OUTPATIENT)
Age: 65
End: 2020-10-16

## 2020-10-21 LAB
25(OH)D3 SERPL-MCNC: 22.8 NG/ML
ALBUMIN SERPL ELPH-MCNC: 4.2 G/DL
ALP BLD-CCNC: 97 U/L
ALT SERPL-CCNC: 15 U/L
ANION GAP SERPL CALC-SCNC: 12 MMOL/L
APPEARANCE: CLEAR
AST SERPL-CCNC: 15 U/L
BACTERIA: NEGATIVE
BASOPHILS # BLD AUTO: 0.04 K/UL
BASOPHILS NFR BLD AUTO: 0.6 %
BILIRUB SERPL-MCNC: 0.3 MG/DL
BILIRUBIN URINE: NEGATIVE
BLOOD URINE: NEGATIVE
BUN SERPL-MCNC: 14 MG/DL
C TRACH RRNA SPEC QL NAA+PROBE: NOT DETECTED
CALCIUM SERPL-MCNC: 9 MG/DL
CD3 CELLS # BLD: 1584 /UL
CD3 CELLS NFR BLD: 78 %
CD3+CD4+ CELLS # BLD: 611 /UL
CD3+CD4+ CELLS NFR BLD: 30 %
CD3+CD4+ CELLS/CD3+CD8+ CLL SPEC: 0.63 RATIO
CD3+CD8+ CELLS # SPEC: 968 /UL
CD3+CD8+ CELLS NFR BLD: 48 %
CHLORIDE SERPL-SCNC: 105 MMOL/L
CHOLEST SERPL-MCNC: 212 MG/DL
CHOLEST/HDLC SERPL: 5.3 RATIO
CO2 SERPL-SCNC: 24 MMOL/L
COLOR: YELLOW
CREAT SERPL-MCNC: 1.33 MG/DL
EOSINOPHIL # BLD AUTO: 0.1 K/UL
EOSINOPHIL NFR BLD AUTO: 1.5 %
ESTIMATED AVERAGE GLUCOSE: 140 MG/DL
GLUCOSE QUALITATIVE U: NEGATIVE
GLUCOSE SERPL-MCNC: 106 MG/DL
HBA1C MFR BLD HPLC: 6.5 %
HCT VFR BLD CALC: 41.9 %
HCV RNA SERPL NAA DL=5-ACNC: NOT DETECTED IU/ML
HCV RNA SERPL NAA+PROBE-LOG IU: NOT DETECTED LOG10IU/ML
HDLC SERPL-MCNC: 40 MG/DL
HGB BLD-MCNC: 13.2 G/DL
HIV1 RNA # SERPL NAA+PROBE: 87
HIV1 RNA # SERPL NAA+PROBE: ABNORMAL
HYALINE CASTS: 2 /LPF
IMM GRANULOCYTES NFR BLD AUTO: 0.3 %
KETONES URINE: NEGATIVE
LDLC SERPL CALC-MCNC: 158 MG/DL
LEUKOCYTE ESTERASE URINE: NEGATIVE
LYMPHOCYTES # BLD AUTO: 2.03 K/UL
LYMPHOCYTES NFR BLD AUTO: 30.2 %
MAN DIFF?: NORMAL
MCHC RBC-ENTMCNC: 29.2 PG
MCHC RBC-ENTMCNC: 31.5 GM/DL
MCV RBC AUTO: 92.7 FL
MICROSCOPIC-UA: NORMAL
MONOCYTES # BLD AUTO: 0.67 K/UL
MONOCYTES NFR BLD AUTO: 10 %
N GONORRHOEA RRNA SPEC QL NAA+PROBE: NOT DETECTED
NEUTROPHILS # BLD AUTO: 3.87 K/UL
NEUTROPHILS NFR BLD AUTO: 57.4 %
NITRITE URINE: NEGATIVE
PH URINE: 6
PHOSPHATE SERPL-MCNC: 2.9 MG/DL
PLATELET # BLD AUTO: 301 K/UL
POTASSIUM SERPL-SCNC: 4.9 MMOL/L
PROT SERPL-MCNC: 6.9 G/DL
PROTEIN URINE: NEGATIVE
PSA SERPL-MCNC: 1.04 NG/ML
RBC # BLD: 4.52 M/UL
RBC # FLD: 12.7 %
RED BLOOD CELLS URINE: 1 /HPF
SARS-COV-2 IGG SERPL IA-ACNC: <0.1 INDEX
SARS-COV-2 IGG SERPL QL IA: NEGATIVE
SODIUM SERPL-SCNC: 141 MMOL/L
SOURCE AMPLIFICATION: NORMAL
SPECIFIC GRAVITY URINE: 1.02
SQUAMOUS EPITHELIAL CELLS: 0 /HPF
T PALLIDUM AB SER QL IA: POSITIVE
TRIGL SERPL-MCNC: 75 MG/DL
UROBILINOGEN URINE: NORMAL
VIRAL LOAD INTERP: NORMAL
VIRAL LOAD LOG: 1.94
WBC # FLD AUTO: 6.73 K/UL
WHITE BLOOD CELLS URINE: 0 /HPF

## 2020-10-25 LAB
TESTOST BND SERPL-MCNC: 8.1 PG/ML
TESTOST SERPL-MCNC: 495.4 NG/DL

## 2020-10-27 ENCOUNTER — NON-APPOINTMENT (OUTPATIENT)
Age: 65
End: 2020-10-27

## 2020-10-28 ENCOUNTER — APPOINTMENT (OUTPATIENT)
Dept: INFECTIOUS DISEASE | Facility: CLINIC | Age: 65
End: 2020-10-28
Payer: MEDICARE

## 2020-10-28 VITALS
DIASTOLIC BLOOD PRESSURE: 90 MMHG | BODY MASS INDEX: 26.88 KG/M2 | WEIGHT: 192 LBS | OXYGEN SATURATION: 98 % | HEART RATE: 89 BPM | SYSTOLIC BLOOD PRESSURE: 133 MMHG | HEIGHT: 71 IN | TEMPERATURE: 97.8 F

## 2020-10-28 DIAGNOSIS — Z87.09 PERSONAL HISTORY OF OTHER DISEASES OF THE RESPIRATORY SYSTEM: ICD-10-CM

## 2020-10-28 PROCEDURE — 99215 OFFICE O/P EST HI 40 MIN: CPT

## 2020-10-28 RX ORDER — CALCIUM CARBONATE 400(1000)
1000 TABLET,CHEWABLE ORAL
Refills: 0 | Status: DISCONTINUED | COMMUNITY
End: 2020-10-28

## 2020-10-28 RX ORDER — FAMOTIDINE 20 MG/1
20 TABLET, FILM COATED ORAL
Qty: 60 | Refills: 3 | Status: DISCONTINUED | COMMUNITY
Start: 2020-06-17 | End: 2020-10-28

## 2020-10-31 LAB
SARS-COV-2 IGG SERPL IA-ACNC: <0.1 INDEX
SARS-COV-2 IGG SERPL QL IA: NEGATIVE
SARS-COV-2 N GENE NPH QL NAA+PROBE: NOT DETECTED

## 2020-10-31 RX ORDER — SULFAMETHOXAZOLE AND TRIMETHOPRIM 400; 80 MG/1; MG/1
400-80 TABLET ORAL TWICE DAILY
Qty: 14 | Refills: 0 | Status: COMPLETED | COMMUNITY
Start: 2020-06-17 | End: 2020-10-31

## 2020-11-02 RX ORDER — BICTEGRAVIR SODIUM, EMTRICITABINE, AND TENOFOVIR ALAFENAMIDE FUMARATE 30; 120; 15 MG/1; MG/1; MG/1
TABLET ORAL
Refills: 0 | Status: DISCONTINUED | COMMUNITY
End: 2020-11-02

## 2020-11-03 ENCOUNTER — NON-APPOINTMENT (OUTPATIENT)
Age: 65
End: 2020-11-03

## 2020-12-04 ENCOUNTER — RX RENEWAL (OUTPATIENT)
Age: 65
End: 2020-12-04

## 2020-12-23 PROBLEM — Z87.09 HISTORY OF ACUTE SINUSITIS: Status: RESOLVED | Noted: 2020-10-28 | Resolved: 2020-12-23

## 2021-02-16 ENCOUNTER — RX RENEWAL (OUTPATIENT)
Age: 66
End: 2021-02-16

## 2021-03-21 ENCOUNTER — RX RENEWAL (OUTPATIENT)
Age: 66
End: 2021-03-21

## 2021-04-05 ENCOUNTER — APPOINTMENT (OUTPATIENT)
Dept: INFECTIOUS DISEASE | Facility: CLINIC | Age: 66
End: 2021-04-05

## 2021-04-06 ENCOUNTER — NON-APPOINTMENT (OUTPATIENT)
Age: 66
End: 2021-04-06

## 2021-04-07 ENCOUNTER — LABORATORY RESULT (OUTPATIENT)
Age: 66
End: 2021-04-07

## 2021-04-07 ENCOUNTER — APPOINTMENT (OUTPATIENT)
Dept: INFECTIOUS DISEASE | Facility: CLINIC | Age: 66
End: 2021-04-07
Payer: MEDICARE

## 2021-04-07 VITALS
HEART RATE: 74 BPM | SYSTOLIC BLOOD PRESSURE: 145 MMHG | TEMPERATURE: 98.5 F | OXYGEN SATURATION: 98 % | HEIGHT: 71 IN | DIASTOLIC BLOOD PRESSURE: 82 MMHG | WEIGHT: 205 LBS | BODY MASS INDEX: 28.7 KG/M2

## 2021-04-07 PROCEDURE — 99215 OFFICE O/P EST HI 40 MIN: CPT

## 2021-04-13 ENCOUNTER — TRANSCRIPTION ENCOUNTER (OUTPATIENT)
Age: 66
End: 2021-04-13

## 2021-04-13 LAB
25(OH)D3 SERPL-MCNC: 26.3 NG/ML
ALBUMIN SERPL ELPH-MCNC: 4.5 G/DL
ALP BLD-CCNC: 78 U/L
ALT SERPL-CCNC: 12 U/L
ANION GAP SERPL CALC-SCNC: 11 MMOL/L
APPEARANCE: CLEAR
AST SERPL-CCNC: 13 U/L
BACTERIA: NEGATIVE
BASOPHILS # BLD AUTO: 0.03 K/UL
BASOPHILS NFR BLD AUTO: 0.4 %
BILIRUB SERPL-MCNC: 0.3 MG/DL
BILIRUBIN URINE: NEGATIVE
BLOOD URINE: NEGATIVE
BUN SERPL-MCNC: 17 MG/DL
CALCIUM SERPL-MCNC: 9.5 MG/DL
CD3 CELLS # BLD: 1542 /UL
CD3 CELLS NFR BLD: 78 %
CD3+CD4+ CELLS # BLD: 602 /UL
CD3+CD4+ CELLS NFR BLD: 31 %
CD3+CD4+ CELLS/CD3+CD8+ CLL SPEC: 0.65 RATIO
CD3+CD8+ CELLS # SPEC: 930 /UL
CD3+CD8+ CELLS NFR BLD: 47 %
CHLORIDE SERPL-SCNC: 104 MMOL/L
CHOLEST SERPL-MCNC: 204 MG/DL
CO2 SERPL-SCNC: 28 MMOL/L
COLOR: YELLOW
COVID-19 NUCLEOCAPSID  GAM ANTIBODY INTERPRETATION: NEGATIVE
CREAT SERPL-MCNC: 1.3 MG/DL
EOSINOPHIL # BLD AUTO: 0.06 K/UL
EOSINOPHIL NFR BLD AUTO: 0.8 %
ESTIMATED AVERAGE GLUCOSE: 137 MG/DL
GLUCOSE QUALITATIVE U: NEGATIVE
GLUCOSE SERPL-MCNC: 91 MG/DL
HBA1C MFR BLD HPLC: 6.4 %
HCT VFR BLD CALC: 39.4 %
HCV AB SER QL: NONREACTIVE
HCV S/CO RATIO: 0.1 S/CO
HDLC SERPL-MCNC: 52 MG/DL
HGB BLD-MCNC: 12.7 G/DL
HIV1 RNA # SERPL NAA+PROBE: 39
HIV1 RNA # SERPL NAA+PROBE: ABNORMAL
HYALINE CASTS: 0 /LPF
IMM GRANULOCYTES NFR BLD AUTO: 0.4 %
KETONES URINE: NEGATIVE
LDLC SERPL CALC-MCNC: 140 MG/DL
LEUKOCYTE ESTERASE URINE: NEGATIVE
LYMPHOCYTES # BLD AUTO: 1.91 K/UL
LYMPHOCYTES NFR BLD AUTO: 24.4 %
M TB IFN-G BLD-IMP: NEGATIVE
MAN DIFF?: NORMAL
MCHC RBC-ENTMCNC: 29.9 PG
MCHC RBC-ENTMCNC: 32.2 GM/DL
MCV RBC AUTO: 92.7 FL
MICROSCOPIC-UA: NORMAL
MONOCYTES # BLD AUTO: 0.63 K/UL
MONOCYTES NFR BLD AUTO: 8 %
NEUTROPHILS # BLD AUTO: 5.17 K/UL
NEUTROPHILS NFR BLD AUTO: 66 %
NITRITE URINE: NEGATIVE
NONHDLC SERPL-MCNC: 152 MG/DL
PH URINE: 6.5
PLATELET # BLD AUTO: 254 K/UL
POTASSIUM SERPL-SCNC: 4.2 MMOL/L
PROT SERPL-MCNC: 6.7 G/DL
PROTEIN URINE: NORMAL
PSA SERPL-MCNC: 0.42 NG/ML
QUANTIFERON TB PLUS MITOGEN MINUS NIL: 8.99 IU/ML
QUANTIFERON TB PLUS NIL: 0.01 IU/ML
QUANTIFERON TB PLUS TB1 MINUS NIL: 0 IU/ML
QUANTIFERON TB PLUS TB2 MINUS NIL: 0 IU/ML
RBC # BLD: 4.25 M/UL
RBC # FLD: 14.3 %
RED BLOOD CELLS URINE: 1 /HPF
SARS-COV-2 AB SERPL QL IA: 0.08 INDEX
SODIUM SERPL-SCNC: 142 MMOL/L
SPECIFIC GRAVITY URINE: 1.03
SQUAMOUS EPITHELIAL CELLS: 0 /HPF
T PALLIDUM AB SER QL IA: POSITIVE
TESTOST BND SERPL-MCNC: 6.4 PG/ML
TESTOST SERPL-MCNC: 352.2 NG/DL
TRIGL SERPL-MCNC: 59 MG/DL
UROBILINOGEN URINE: NORMAL
VIRAL LOAD INTERP: NORMAL
VIRAL LOAD LOG: 1.59
WBC # FLD AUTO: 7.83 K/UL
WHITE BLOOD CELLS URINE: 1 /HPF

## 2021-04-15 ENCOUNTER — NON-APPOINTMENT (OUTPATIENT)
Age: 66
End: 2021-04-15

## 2021-04-19 ENCOUNTER — APPOINTMENT (OUTPATIENT)
Dept: NEPHROLOGY | Facility: CLINIC | Age: 66
End: 2021-04-19
Payer: MEDICARE

## 2021-04-19 PROCEDURE — 99204 OFFICE O/P NEW MOD 45 MIN: CPT | Mod: 95

## 2021-04-19 RX ORDER — FAMOTIDINE 10 MG/1
TABLET, FILM COATED ORAL
Refills: 0 | Status: ACTIVE | COMMUNITY

## 2021-04-19 RX ORDER — UBIDECARENONE/VIT E ACET 100MG-5
CAPSULE ORAL
Refills: 0 | Status: DISCONTINUED | COMMUNITY
End: 2021-04-19

## 2021-04-19 RX ORDER — FAMOTIDINE 20 MG/1
20 TABLET ORAL
Refills: 0 | Status: DISCONTINUED | COMMUNITY
Start: 2021-04-07 | End: 2021-04-19

## 2021-04-19 NOTE — PHYSICAL EXAM
[General Appearance - Alert] : alert [General Appearance - In No Acute Distress] : in no acute distress [Sclera] : the sclera and conjunctiva were normal [PERRL With Normal Accommodation] : pupils were equal in size, round, and reactive to light [Extraocular Movements] : extraocular movements were intact [Outer Ear] : the ears and nose were normal in appearance [Oropharynx] : the oropharynx was normal [Neck Appearance] : the appearance of the neck was normal [Neck Cervical Mass (___cm)] : no neck mass was observed [Exaggerated Use Of Accessory Muscles For Inspiration] : no accessory muscle use [Abnormal Walk] : normal gait [Edema] : there was no peripheral edema [Skin Color & Pigmentation] : normal skin color and pigmentation [Skin Turgor] : normal skin turgor [] : no rash [No Focal Deficits] : no focal deficits [Oriented To Time, Place, And Person] : oriented to person, place, and time [Affect] : the affect was normal [Impaired Insight] : insight and judgment were intact

## 2021-04-19 NOTE — REASON FOR VISIT
[Home] : at home, [unfilled] , at the time of the visit. [Medical Office: ([facility])___] : at the medical office located in  [Verbal consent obtained from patient] : the patient, [unfilled] [Initial Evaluation] : an initial evaluation

## 2021-04-29 ENCOUNTER — OUTPATIENT (OUTPATIENT)
Dept: OUTPATIENT SERVICES | Facility: HOSPITAL | Age: 66
LOS: 1 days | End: 2021-04-29
Payer: MEDICARE

## 2021-04-29 ENCOUNTER — APPOINTMENT (OUTPATIENT)
Dept: ULTRASOUND IMAGING | Facility: IMAGING CENTER | Age: 66
End: 2021-04-29
Payer: MEDICARE

## 2021-04-29 DIAGNOSIS — N20.0 CALCULUS OF KIDNEY: ICD-10-CM

## 2021-04-29 DIAGNOSIS — Z98.890 OTHER SPECIFIED POSTPROCEDURAL STATES: Chronic | ICD-10-CM

## 2021-04-29 PROCEDURE — 76770 US EXAM ABDO BACK WALL COMP: CPT

## 2021-04-29 PROCEDURE — 76770 US EXAM ABDO BACK WALL COMP: CPT | Mod: 26

## 2021-05-07 ENCOUNTER — OUTPATIENT (OUTPATIENT)
Dept: OUTPATIENT SERVICES | Facility: HOSPITAL | Age: 66
LOS: 1 days | End: 2021-05-07
Payer: MEDICARE

## 2021-05-07 ENCOUNTER — RX RENEWAL (OUTPATIENT)
Age: 66
End: 2021-05-07

## 2021-05-07 ENCOUNTER — APPOINTMENT (OUTPATIENT)
Dept: UROLOGY | Facility: CLINIC | Age: 66
End: 2021-05-07
Payer: MEDICARE

## 2021-05-07 VITALS
SYSTOLIC BLOOD PRESSURE: 137 MMHG | HEART RATE: 87 BPM | RESPIRATION RATE: 17 BRPM | DIASTOLIC BLOOD PRESSURE: 70 MMHG | TEMPERATURE: 98.2 F

## 2021-05-07 DIAGNOSIS — Z98.890 OTHER SPECIFIED POSTPROCEDURAL STATES: Chronic | ICD-10-CM

## 2021-05-07 DIAGNOSIS — R35.0 FREQUENCY OF MICTURITION: ICD-10-CM

## 2021-05-07 PROCEDURE — 51798 US URINE CAPACITY MEASURE: CPT

## 2021-05-07 PROCEDURE — 51741 ELECTRO-UROFLOWMETRY FIRST: CPT

## 2021-05-07 PROCEDURE — 99213 OFFICE O/P EST LOW 20 MIN: CPT

## 2021-05-07 NOTE — PHYSICAL EXAM
[General Appearance - Well Developed] : well developed [General Appearance - Well Nourished] : well nourished [Edema] : no peripheral edema [Exaggerated Use Of Accessory Muscles For Inspiration] : no accessory muscle use [Abdomen Soft] : soft [Abdomen Tenderness] : non-tender [Abdomen Mass (___ Cm)] : no abdominal mass palpated [Abdomen Hernia] : no hernia was discovered [Size ___ (gms)] : size was estimated to be [unfilled] g [Nl Inspection] : the anus was normal on inspection. [Nl Sphincter Tone] : normal sphincter tone [No Lesions] : no lesions [Circumcised] : the penis was circumcised [Normal] : normal [Testes] : normal [Epididymis] : was normal [Vas Deferens / Spermatic Cord] : was normal [Normal Station and Gait] : the gait and station were normal for the patient's age [] : no rash [No Focal Deficits] : no focal deficits [Oriented To Time, Place, And Person] : oriented to person, place, and time [Prostate Hard Area Or Nodule Bilaterally] : had no palpable nodules [Meatal Stenosis] : no meatal stenosis [Scrotum Hydrocele On The Right] : no hydrocele [Scrotum Hydrocele On The Left] : no hydrocele

## 2021-05-07 NOTE — ASSESSMENT
[FreeTextEntry1] : discussed procedure - but will trial escalating doses of Terazosin.\par stop finasteride - prostate too small for efficacy

## 2021-05-07 NOTE — HISTORY OF PRESENT ILLNESS
[FreeTextEntry1] : here for management of LUTs.\par h/o LUTs for several years on Tamsulosin\par notes slow FOS with occasional pushing, daytime frequency, some urgency and nocturia every hours. No incontinence, retention, hematuria, or UTIs.  if stops the tamsulosin harder to void and more frequency \par has been on finasteride for 3 weeks.\par Had renal/bladder ULS - kidneys OK, prostate small and empties well. \par \par voided 130cc at 5cc peak PVR 30

## 2021-06-02 DIAGNOSIS — R35.0 FREQUENCY OF MICTURITION: ICD-10-CM

## 2021-06-02 DIAGNOSIS — N40.1 BENIGN PROSTATIC HYPERPLASIA WITH LOWER URINARY TRACT SYMPTOMS: ICD-10-CM

## 2021-07-06 ENCOUNTER — APPOINTMENT (OUTPATIENT)
Dept: UROLOGY | Facility: CLINIC | Age: 66
End: 2021-07-06
Payer: MEDICARE

## 2021-07-06 PROCEDURE — 99213 OFFICE O/P EST LOW 20 MIN: CPT

## 2021-07-06 RX ORDER — FINASTERIDE 5 MG/1
5 TABLET, FILM COATED ORAL
Qty: 90 | Refills: 3 | Status: DISCONTINUED | COMMUNITY
Start: 2021-04-19 | End: 2021-07-06

## 2021-07-06 NOTE — HISTORY OF PRESENT ILLNESS
[FreeTextEntry1] : here for management of LUTs.\par h/o LUTs for several years on Tamsulosin\par notes slow FOS with occasional pushing, daytime frequency, some urgency and nocturia every 2 hours. No incontinence, retention, hematuria, or UTIs.  if stops the tamsulosin harder to void and more frequency \par has been on finasteride for 3 weeks.\par Had renal/bladder ULS - kidneys OK, prostate small and empties well. \par voided 130cc at 5cc peak PVR 30 \par \par had him stop the finasteride and start terazosin - only took the 2mg but couldn't tolerate due to stomach issues.\par didn't notice any difference either - 10 days.

## 2021-07-06 NOTE — ASSESSMENT
[FreeTextEntry1] : discussed alteratives including a procedure; i don't think he needs a procedure.\par descibed a Button TURP in detail - will wait for now \par

## 2021-08-09 ENCOUNTER — RX RENEWAL (OUTPATIENT)
Age: 66
End: 2021-08-09

## 2021-10-04 ENCOUNTER — LABORATORY RESULT (OUTPATIENT)
Age: 66
End: 2021-10-04

## 2021-10-04 ENCOUNTER — APPOINTMENT (OUTPATIENT)
Dept: INFECTIOUS DISEASE | Facility: CLINIC | Age: 66
End: 2021-10-04

## 2021-10-05 LAB
25(OH)D3 SERPL-MCNC: 21.6 NG/ML
ALBUMIN SERPL ELPH-MCNC: 4.5 G/DL
ALP BLD-CCNC: 78 U/L
ALT SERPL-CCNC: 18 U/L
ANION GAP SERPL CALC-SCNC: 14 MMOL/L
APPEARANCE: CLEAR
AST SERPL-CCNC: 17 U/L
BACTERIA: NEGATIVE
BASOPHILS # BLD AUTO: 0.03 K/UL
BASOPHILS NFR BLD AUTO: 0.5 %
BILIRUB SERPL-MCNC: <0.2 MG/DL
BILIRUBIN URINE: NEGATIVE
BLOOD URINE: NEGATIVE
BUN SERPL-MCNC: 15 MG/DL
C TRACH RRNA SPEC QL NAA+PROBE: NOT DETECTED
CALCIUM SERPL-MCNC: 9.5 MG/DL
CD3 CELLS # BLD: 1598 /UL
CD3 CELLS NFR BLD: 74 %
CD3+CD4+ CELLS # BLD: 626 /UL
CD3+CD4+ CELLS NFR BLD: 29 %
CD3+CD4+ CELLS/CD3+CD8+ CLL SPEC: 0.64 RATIO
CD3+CD8+ CELLS # SPEC: 972 /UL
CD3+CD8+ CELLS NFR BLD: 45 %
CHLORIDE SERPL-SCNC: 105 MMOL/L
CHOLEST SERPL-MCNC: 185 MG/DL
CO2 SERPL-SCNC: 22 MMOL/L
COLOR: NORMAL
CREAT SERPL-MCNC: 1.19 MG/DL
EOSINOPHIL # BLD AUTO: 0.16 K/UL
EOSINOPHIL NFR BLD AUTO: 2.6 %
ESTIMATED AVERAGE GLUCOSE: 128 MG/DL
GLUCOSE QUALITATIVE U: NEGATIVE
GLUCOSE SERPL-MCNC: 111 MG/DL
HBA1C MFR BLD HPLC: 6.1 %
HCT VFR BLD CALC: 37.7 %
HCV AB SER QL: NONREACTIVE
HCV S/CO RATIO: 0.15 S/CO
HDLC SERPL-MCNC: 53 MG/DL
HGB BLD-MCNC: 12.3 G/DL
HIV1 RNA # SERPL NAA+PROBE: 64
HIV1 RNA # SERPL NAA+PROBE: ABNORMAL
HYALINE CASTS: 0 /LPF
IMM GRANULOCYTES NFR BLD AUTO: 0.2 %
KETONES URINE: NEGATIVE
LDLC SERPL CALC-MCNC: 105 MG/DL
LEUKOCYTE ESTERASE URINE: NEGATIVE
LYMPHOCYTES # BLD AUTO: 2.1 K/UL
LYMPHOCYTES NFR BLD AUTO: 34.5 %
MAN DIFF?: NORMAL
MCHC RBC-ENTMCNC: 30.4 PG
MCHC RBC-ENTMCNC: 32.6 GM/DL
MCV RBC AUTO: 93.1 FL
MICROSCOPIC-UA: NORMAL
MONOCYTES # BLD AUTO: 0.63 K/UL
MONOCYTES NFR BLD AUTO: 10.3 %
N GONORRHOEA RRNA SPEC QL NAA+PROBE: NOT DETECTED
NEUTROPHILS # BLD AUTO: 3.16 K/UL
NEUTROPHILS NFR BLD AUTO: 51.9 %
NITRITE URINE: NEGATIVE
NONHDLC SERPL-MCNC: 132 MG/DL
PH URINE: 6.5
PLATELET # BLD AUTO: 233 K/UL
POTASSIUM SERPL-SCNC: 4.3 MMOL/L
PROT SERPL-MCNC: 6.8 G/DL
PROTEIN URINE: NEGATIVE
PSA SERPL-MCNC: 0.4 NG/ML
RBC # BLD: 4.05 M/UL
RBC # FLD: 13.5 %
RED BLOOD CELLS URINE: 0 /HPF
SODIUM SERPL-SCNC: 141 MMOL/L
SOURCE AMPLIFICATION: NORMAL
SPECIFIC GRAVITY URINE: 1.02
SQUAMOUS EPITHELIAL CELLS: 0 /HPF
TRIGL SERPL-MCNC: 133 MG/DL
UROBILINOGEN URINE: NORMAL
VIRAL LOAD INTERP: NORMAL
VIRAL LOAD LOG: 1.81
WBC # FLD AUTO: 6.09 K/UL
WHITE BLOOD CELLS URINE: 0 /HPF

## 2021-10-06 ENCOUNTER — APPOINTMENT (OUTPATIENT)
Dept: INFECTIOUS DISEASE | Facility: CLINIC | Age: 66
End: 2021-10-06
Payer: MEDICARE

## 2021-10-06 VITALS
TEMPERATURE: 97.4 F | BODY MASS INDEX: 28.56 KG/M2 | DIASTOLIC BLOOD PRESSURE: 77 MMHG | OXYGEN SATURATION: 97 % | HEIGHT: 71 IN | HEART RATE: 80 BPM | WEIGHT: 204 LBS | RESPIRATION RATE: 16 BRPM | SYSTOLIC BLOOD PRESSURE: 131 MMHG

## 2021-10-06 DIAGNOSIS — K29.70 GASTRITIS, UNSPECIFIED, W/OUT BLEEDING: ICD-10-CM

## 2021-10-06 DIAGNOSIS — K62.5 HEMORRHAGE OF ANUS AND RECTUM: ICD-10-CM

## 2021-10-06 DIAGNOSIS — N20.0 CALCULUS OF KIDNEY: ICD-10-CM

## 2021-10-06 DIAGNOSIS — B00.9 HERPESVIRAL INFECTION, UNSPECIFIED: ICD-10-CM

## 2021-10-06 LAB
M TB IFN-G BLD-IMP: NEGATIVE
QUANTIFERON TB PLUS MITOGEN MINUS NIL: 9.41 IU/ML
QUANTIFERON TB PLUS NIL: 0.03 IU/ML
QUANTIFERON TB PLUS TB1 MINUS NIL: -0.01 IU/ML
QUANTIFERON TB PLUS TB2 MINUS NIL: -0.01 IU/ML

## 2021-10-06 PROCEDURE — 99215 OFFICE O/P EST HI 40 MIN: CPT

## 2021-10-06 RX ORDER — TAMSULOSIN HYDROCHLORIDE 0.4 MG/1
0.4 CAPSULE ORAL
Qty: 90 | Refills: 3 | Status: DISCONTINUED | COMMUNITY
Start: 2018-08-14 | End: 2021-10-06

## 2021-10-06 RX ORDER — TERAZOSIN 2 MG/1
2 CAPSULE ORAL
Qty: 90 | Refills: 3 | Status: DISCONTINUED | COMMUNITY
Start: 2021-05-07 | End: 2021-10-06

## 2021-10-08 LAB
FERRITIN SERPL-MCNC: 33 NG/ML
FOLATE SERPL-MCNC: 5.7 NG/ML
IRON SATN MFR SERPL: 26 %
IRON SERPL-MCNC: 91 UG/DL
T PALLIDUM AB SER QL IA: POSITIVE
TESTOST BND SERPL-MCNC: 4.1 PG/ML
TESTOSTERONE TOTAL S: 363 NG/DL
TIBC SERPL-MCNC: 353 UG/DL
UIBC SERPL-MCNC: 262 UG/DL
VIT B12 SERPL-MCNC: 486 PG/ML

## 2021-10-10 PROBLEM — K62.5 RECTAL BLEEDING: Status: ACTIVE | Noted: 2021-10-06

## 2021-10-10 PROBLEM — N20.0 NEPHROLITHIASIS: Status: ACTIVE | Noted: 2021-04-19

## 2021-10-10 PROBLEM — K29.70 GASTRITIS: Status: ACTIVE | Noted: 2020-06-17

## 2021-10-10 PROBLEM — B00.9 HERPES SIMPLEX TYPE 2 INFECTION: Status: ACTIVE | Noted: 2020-06-19

## 2021-10-10 NOTE — REVIEW OF SYSTEMS
[Hesitancy] : urinary hesitancy [Nocturia] : nocturia [Negative] : Heme/Lymph [Fever] : no fever [Chills] : no chills [FreeTextEntry2] : always achy   has a little night sweats last week or so  not few days  [FreeTextEntry3] : last eye exam - recent  [FreeTextEntry5] : tingling sensation on left [FreeTextEntry4] : just had a tooth pulled  [FreeTextEntry7] : BRB when straining to stool  last colonoscopy about ten years ago.   [de-identified] : claims hasn't missed a dose

## 2021-10-10 NOTE — PHYSICAL EXAM
[General Appearance - Alert] : alert [General Appearance - In No Acute Distress] : in no acute distress [Sclera] : the sclera and conjunctiva were normal [PERRL With Normal Accommodation] : pupils were equal in size, round, reactive to light [Extraocular Movements] : extraocular movements were intact [Outer Ear] : the ears and nose were normal in appearance [Oropharynx] : the oropharynx was normal with no thrush [Neck Appearance] : the appearance of the neck was normal [Neck Cervical Mass (___cm)] : no neck mass was observed [Jugular Venous Distention Increased] : there was no jugular-venous distention [Thyroid Diffuse Enlargement] : the thyroid was not enlarged [Auscultation Breath Sounds / Voice Sounds] : lungs were clear to auscultation bilaterally [Heart Rate And Rhythm] : heart rate was normal and rhythm regular [Heart Sounds] : normal S1 and S2 [Heart Sounds Gallop] : no gallops [Murmurs] : no murmurs [Heart Sounds Pericardial Friction Rub] : no pericardial rub [Full Pulse] : the pedal pulses are present [Edema] : there was no peripheral edema [Bowel Sounds] : normal bowel sounds [Abdomen Soft] : soft [Abdomen Tenderness] : non-tender [Abdomen Mass (___ Cm)] : no abdominal mass palpated [Costovertebral Angle Tenderness] : no CVA tenderness [No Palpable Adenopathy] : no palpable adenopathy [Skin Color & Pigmentation] : normal skin color and pigmentation [] : no rash [Deep Tendon Reflexes (DTR)] : deep tendon reflexes were 2+ and symmetric [Sensation] : the sensory exam was normal to light touch and pinprick [No Focal Deficits] : no focal deficits [Oriented To Time, Place, And Person] : oriented to person, place, and time [Affect] : the affect was normal [FreeTextEntry1] : scrape left anterior shin

## 2021-10-10 NOTE — HISTORY OF PRESENT ILLNESS
[FreeTextEntry1] : pt's mother  in March \par \par went to urologist- was on Tamsulosin and told patient to add terazoin 2 mg  and then switch to 7 mg /day\par tried taking the terazosin the 5 mg and almost collapsed \par told pt the only alternative is surgery\par so told pt to go back on tamsulosin\par so now on terazosin 5 mg dialy\par \par since taking terazosin feels a weird humming feeling in left shoulder\par went to dentist - told patient mouth lesion  isn't Kaposi sarcoma\par pt is bleeding a little rectally \par urination is easier with terazosin\par \par stool is ok\par \par never misses a dose of medicine\par bleeding rectally\par sometimes takes six to eight aspirin a day because gets tightening in throat and gets nervous\par \par felt through ceiling in house - up to waist   [de-identified] : none [de-identified] :  [de-identified] : in a relationship [de-identified] : self

## 2021-10-10 NOTE — ASSESSMENT
[FreeTextEntry1] : 67 yo male here for follow up for his HIV. History is also signfiicant for elevated HgA1c, BPH, GERD,and chest discmfort\par \par A/P\par \par HIV\par  Patient  claims he hasn't missed a pill - this concerns me because he is still detectable. According to his resistance testing he should have responded to current treatment , unless there is some reason he isn't absorbing his pills - as he claims. Pt stopped tums but still detectable. Can repeat resistance testing but that wasn't the problem in the past.It could also be from his vitamins that blocks absorption but he claims he isn't taking them anymore\par check HIV resistance testing\par \par \par UROLOGY\par needs to follow up with urology- pt with crystalluria and describes sediment in his urine \par Pt is now on meds for his prostate with some improvement in sxs\par will check PSA\par \par PSYCH\par I really think the patient would benefit from counseling. 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\par \par \par ELEVATED CREATININE\par pt needs to see nephrologist for elevated creatinine and for calcium oxylate crystals\par \par \par Health care Maintenance\par discussed hyperlipidemia with patient - he will try diet at first.\par refuses flu shot\par refuse covid shot- believes he has had Covid when mother was sick \par \par #CHEST PAIN\par NEEDS TO SEE CARDIOLOGY\par Having left chest sensation\par pops aspirin for fear that its cardiac\par exercise - doesn't do much- disussed the need for exercise\par \par #GASTROENTEROLOGY\par NEEDS COLONOSCOPY\par Has BRBPR\par Has indigestion\par takes many aspirins \par never tested for H pylori\par OF CONCERN, PT IS NOW ANEMIC  WILL CHECK B12, FOLATE AND IRON STUDIES \par \par \par

## 2021-11-09 ENCOUNTER — NON-APPOINTMENT (OUTPATIENT)
Age: 66
End: 2021-11-09

## 2021-11-09 ENCOUNTER — APPOINTMENT (OUTPATIENT)
Dept: CARDIOLOGY | Facility: CLINIC | Age: 66
End: 2021-11-09
Payer: MEDICARE

## 2021-11-09 VITALS
HEIGHT: 71 IN | HEART RATE: 89 BPM | SYSTOLIC BLOOD PRESSURE: 150 MMHG | OXYGEN SATURATION: 97 % | RESPIRATION RATE: 16 BRPM | WEIGHT: 206 LBS | BODY MASS INDEX: 28.84 KG/M2 | DIASTOLIC BLOOD PRESSURE: 90 MMHG

## 2021-11-09 DIAGNOSIS — R20.2 PARESTHESIA OF SKIN: ICD-10-CM

## 2021-11-09 PROCEDURE — 99205 OFFICE O/P NEW HI 60 MIN: CPT

## 2021-11-09 PROCEDURE — 93000 ELECTROCARDIOGRAM COMPLETE: CPT

## 2021-12-16 ENCOUNTER — APPOINTMENT (OUTPATIENT)
Dept: CARDIOLOGY | Facility: CLINIC | Age: 66
End: 2021-12-16

## 2021-12-17 ENCOUNTER — NON-APPOINTMENT (OUTPATIENT)
Age: 66
End: 2021-12-17

## 2022-02-11 ENCOUNTER — APPOINTMENT (OUTPATIENT)
Dept: UROLOGY | Facility: CLINIC | Age: 67
End: 2022-02-11
Payer: MEDICARE

## 2022-02-11 PROCEDURE — 99213 OFFICE O/P EST LOW 20 MIN: CPT

## 2022-02-11 NOTE — HISTORY OF PRESENT ILLNESS
[FreeTextEntry1] : here for management of LUTs.\par h/o LUTs for several years on Tamsulosin\par notes slow FOS with occasional pushing, daytime frequency, some urgency and nocturia every 2 hours. No incontinence, retention, hematuria, or UTIs.  if stops the tamsulosin harder to void and more frequency \par has been on finasteride for 3 weeks.\par Had renal/bladder ULS - kidneys OK, prostate small and empties well. \par voided 130cc at 5cc peak PVR 30 \par \par had him stop the finasteride and start terazosin - only took the 2mg but couldn't tolerate due to stomach issues.\par didn't notice any difference either - 10 days. \par \par 2/22 - after last visit tried to up dose of terazosin to 5mg but had pins and needles in arms so stopped on Tamsulosin. he finds it helps with the FOS and voids easier. Still with nocturia 4-5 times.

## 2022-02-11 NOTE — ASSESSMENT
[FreeTextEntry1] : will try double dose tamsulosin\par don't feel strongly about an outlet procedure

## 2022-02-12 ENCOUNTER — RX RENEWAL (OUTPATIENT)
Age: 67
End: 2022-02-12

## 2022-03-01 PROBLEM — R07.9 CHEST PAIN: Status: ACTIVE | Noted: 2021-10-06

## 2022-03-08 ENCOUNTER — APPOINTMENT (OUTPATIENT)
Dept: CARDIOLOGY | Facility: CLINIC | Age: 67
End: 2022-03-08
Payer: MEDICARE

## 2022-03-08 VITALS
HEIGHT: 71 IN | HEART RATE: 91 BPM | DIASTOLIC BLOOD PRESSURE: 88 MMHG | OXYGEN SATURATION: 97 % | WEIGHT: 205 LBS | BODY MASS INDEX: 28.7 KG/M2 | SYSTOLIC BLOOD PRESSURE: 144 MMHG

## 2022-03-08 DIAGNOSIS — R07.9 CHEST PAIN, UNSPECIFIED: ICD-10-CM

## 2022-03-08 PROCEDURE — 99215 OFFICE O/P EST HI 40 MIN: CPT

## 2022-04-06 ENCOUNTER — APPOINTMENT (OUTPATIENT)
Dept: CARDIOLOGY | Facility: CLINIC | Age: 67
End: 2022-04-06
Payer: MEDICARE

## 2022-04-06 ENCOUNTER — APPOINTMENT (OUTPATIENT)
Dept: INFECTIOUS DISEASE | Facility: CLINIC | Age: 67
End: 2022-04-06

## 2022-04-06 PROCEDURE — ZZZZZ: CPT

## 2022-04-12 ENCOUNTER — APPOINTMENT (OUTPATIENT)
Dept: CARDIOLOGY | Facility: CLINIC | Age: 67
End: 2022-04-12

## 2022-04-20 ENCOUNTER — NON-APPOINTMENT (OUTPATIENT)
Age: 67
End: 2022-04-20

## 2022-05-09 ENCOUNTER — LABORATORY RESULT (OUTPATIENT)
Age: 67
End: 2022-05-09

## 2022-05-09 ENCOUNTER — APPOINTMENT (OUTPATIENT)
Dept: INFECTIOUS DISEASE | Facility: CLINIC | Age: 67
End: 2022-05-09

## 2022-05-10 LAB
25(OH)D3 SERPL-MCNC: 16 NG/ML
ALBUMIN SERPL ELPH-MCNC: 4.4 G/DL
ALP BLD-CCNC: 75 U/L
ALT SERPL-CCNC: 17 U/L
ANION GAP SERPL CALC-SCNC: 14 MMOL/L
APPEARANCE: CLEAR
AST SERPL-CCNC: 18 U/L
BACTERIA: NEGATIVE
BASOPHILS # BLD AUTO: 0.03 K/UL
BASOPHILS NFR BLD AUTO: 0.5 %
BILIRUB SERPL-MCNC: 0.2 MG/DL
BILIRUBIN URINE: NEGATIVE
BLOOD URINE: NEGATIVE
BUN SERPL-MCNC: 15 MG/DL
CALCIUM SERPL-MCNC: 9.5 MG/DL
CD3 CELLS # BLD: 1286 /UL
CD3 CELLS NFR BLD: 73 %
CD3+CD4+ CELLS # BLD: 572 /UL
CD3+CD4+ CELLS NFR BLD: 32 %
CD3+CD4+ CELLS/CD3+CD8+ CLL SPEC: 0.81 RATIO
CD3+CD8+ CELLS # SPEC: 702 /UL
CD3+CD8+ CELLS NFR BLD: 40 %
CHLORIDE SERPL-SCNC: 102 MMOL/L
CHOLEST SERPL-MCNC: 210 MG/DL
CO2 SERPL-SCNC: 25 MMOL/L
COLOR: NORMAL
CREAT SERPL-MCNC: 1.31 MG/DL
EGFR: 60 ML/MIN/1.73M2
EOSINOPHIL # BLD AUTO: 0.1 K/UL
EOSINOPHIL NFR BLD AUTO: 1.7 %
ESTIMATED AVERAGE GLUCOSE: 134 MG/DL
GLUCOSE QUALITATIVE U: NEGATIVE
GLUCOSE SERPL-MCNC: 145 MG/DL
HBA1C MFR BLD HPLC: 6.3 %
HCT VFR BLD CALC: 44.2 %
HDLC SERPL-MCNC: 54 MG/DL
HGB BLD-MCNC: 14.1 G/DL
HYALINE CASTS: 0 /LPF
IMM GRANULOCYTES NFR BLD AUTO: 0.3 %
KETONES URINE: NEGATIVE
LDLC SERPL CALC-MCNC: 130 MG/DL
LEUKOCYTE ESTERASE URINE: NEGATIVE
LYMPHOCYTES # BLD AUTO: 1.88 K/UL
LYMPHOCYTES NFR BLD AUTO: 32.2 %
MAN DIFF?: NORMAL
MCHC RBC-ENTMCNC: 31.2 PG
MCHC RBC-ENTMCNC: 31.9 GM/DL
MCV RBC AUTO: 97.8 FL
MICROSCOPIC-UA: NORMAL
MONOCYTES # BLD AUTO: 0.53 K/UL
MONOCYTES NFR BLD AUTO: 9.1 %
NEUTROPHILS # BLD AUTO: 3.28 K/UL
NEUTROPHILS NFR BLD AUTO: 56.2 %
NITRITE URINE: NEGATIVE
NONHDLC SERPL-MCNC: 156 MG/DL
PH URINE: 6.5
PLATELET # BLD AUTO: 224 K/UL
POTASSIUM SERPL-SCNC: 4.9 MMOL/L
PROT SERPL-MCNC: 6.7 G/DL
PROTEIN URINE: NORMAL
PSA SERPL-MCNC: 0.4 NG/ML
RBC # BLD: 4.52 M/UL
RBC # FLD: 13.5 %
RED BLOOD CELLS URINE: 1 /HPF
SODIUM SERPL-SCNC: 141 MMOL/L
SPECIFIC GRAVITY URINE: 1.02
SQUAMOUS EPITHELIAL CELLS: 1 /HPF
TRIGL SERPL-MCNC: 130 MG/DL
UROBILINOGEN URINE: NORMAL
WBC # FLD AUTO: 5.84 K/UL
WHITE BLOOD CELLS URINE: 1 /HPF

## 2022-05-12 ENCOUNTER — NON-APPOINTMENT (OUTPATIENT)
Age: 67
End: 2022-05-12

## 2022-05-12 LAB
C TRACH RRNA SPEC QL NAA+PROBE: NOT DETECTED
HIV1 RNA # SERPL NAA+PROBE: 31
HIV1 RNA # SERPL NAA+PROBE: ABNORMAL
M TB IFN-G BLD-IMP: NEGATIVE
N GONORRHOEA RRNA SPEC QL NAA+PROBE: NOT DETECTED
QUANTIFERON TB PLUS MITOGEN MINUS NIL: 9.97 IU/ML
QUANTIFERON TB PLUS NIL: 0.03 IU/ML
QUANTIFERON TB PLUS TB1 MINUS NIL: 0 IU/ML
QUANTIFERON TB PLUS TB2 MINUS NIL: 0.01 IU/ML
SOURCE AMPLIFICATION: NORMAL
T PALLIDUM AB SER QL IA: POSITIVE
VIRAL LOAD INTERP: NORMAL
VIRAL LOAD LOG: 1.5

## 2022-05-13 ENCOUNTER — APPOINTMENT (OUTPATIENT)
Dept: INFECTIOUS DISEASE | Facility: CLINIC | Age: 67
End: 2022-05-13
Payer: MEDICARE

## 2022-05-13 VITALS
TEMPERATURE: 97.9 F | OXYGEN SATURATION: 96 % | HEART RATE: 86 BPM | BODY MASS INDEX: 30.24 KG/M2 | SYSTOLIC BLOOD PRESSURE: 157 MMHG | WEIGHT: 216 LBS | HEIGHT: 71 IN | DIASTOLIC BLOOD PRESSURE: 110 MMHG | RESPIRATION RATE: 16 BRPM

## 2022-05-13 VITALS — DIASTOLIC BLOOD PRESSURE: 100 MMHG | SYSTOLIC BLOOD PRESSURE: 150 MMHG

## 2022-05-13 PROCEDURE — 99215 OFFICE O/P EST HI 40 MIN: CPT

## 2022-05-20 LAB — HLA-B*57:01 QL: NEGATIVE

## 2022-05-23 LAB
BICTEGRAVIR RESISTANCE: NORMAL
CABOTEGRAVIR RESISTANCE: NORMAL
DOLUTEGRAVIR RESISTANCE: NORMAL
ELVITEGRAVIR RESISTANCE: NORMAL
HIV RT GENE MUT DET ISLT: NORMAL
HIV-1 GENOTYPE DRUG RESISTANCE: DETECTED
HIV-1 INTEGRASE GENOTYPE: NORMAL
RALTEGRAVIR RESISTANCE: NORMAL
VIRAL LOAD DATE: NORMAL

## 2022-05-26 ENCOUNTER — NON-APPOINTMENT (OUTPATIENT)
Age: 67
End: 2022-05-26

## 2022-05-27 ENCOUNTER — NON-APPOINTMENT (OUTPATIENT)
Age: 67
End: 2022-05-27

## 2022-06-13 ENCOUNTER — NON-APPOINTMENT (OUTPATIENT)
Age: 67
End: 2022-06-13

## 2022-06-14 ENCOUNTER — NON-APPOINTMENT (OUTPATIENT)
Age: 67
End: 2022-06-14

## 2022-06-21 ENCOUNTER — NON-APPOINTMENT (OUTPATIENT)
Age: 67
End: 2022-06-21

## 2022-06-24 ENCOUNTER — NON-APPOINTMENT (OUTPATIENT)
Age: 67
End: 2022-06-24

## 2022-07-11 ENCOUNTER — NON-APPOINTMENT (OUTPATIENT)
Age: 67
End: 2022-07-11

## 2022-07-13 ENCOUNTER — NON-APPOINTMENT (OUTPATIENT)
Age: 67
End: 2022-07-13

## 2022-08-04 ENCOUNTER — NON-APPOINTMENT (OUTPATIENT)
Age: 67
End: 2022-08-04

## 2022-08-05 ENCOUNTER — LABORATORY RESULT (OUTPATIENT)
Age: 67
End: 2022-08-05

## 2022-08-05 ENCOUNTER — APPOINTMENT (OUTPATIENT)
Dept: INFECTIOUS DISEASE | Facility: CLINIC | Age: 67
End: 2022-08-05

## 2022-08-05 VITALS
TEMPERATURE: 98.9 F | SYSTOLIC BLOOD PRESSURE: 151 MMHG | RESPIRATION RATE: 16 BRPM | HEART RATE: 98 BPM | WEIGHT: 206 LBS | HEIGHT: 71 IN | BODY MASS INDEX: 28.84 KG/M2 | OXYGEN SATURATION: 97 % | DIASTOLIC BLOOD PRESSURE: 96 MMHG

## 2022-08-05 DIAGNOSIS — R19.7 DIARRHEA, UNSPECIFIED: ICD-10-CM

## 2022-08-05 DIAGNOSIS — R39.198 OTHER DIFFICULTIES WITH MICTURITION: ICD-10-CM

## 2022-08-05 DIAGNOSIS — L03.032 CELLULITIS OF LEFT TOE: ICD-10-CM

## 2022-08-05 DIAGNOSIS — R35.1 BENIGN PROSTATIC HYPERPLASIA WITH LOWER URINARY TRACT SYMPMS: ICD-10-CM

## 2022-08-05 DIAGNOSIS — N40.1 BENIGN PROSTATIC HYPERPLASIA WITH LOWER URINARY TRACT SYMPMS: ICD-10-CM

## 2022-08-05 PROCEDURE — 99215 OFFICE O/P EST HI 40 MIN: CPT

## 2022-08-07 LAB
25(OH)D3 SERPL-MCNC: 24.6 NG/ML
APPEARANCE: CLEAR
BACTERIA: NEGATIVE
BILIRUBIN URINE: NEGATIVE
BLOOD URINE: NEGATIVE
CD3 CELLS # BLD: 1236 /UL
CD3 CELLS NFR BLD: 75 %
CD3+CD4+ CELLS # BLD: 538 /UL
CD3+CD4+ CELLS NFR BLD: 33 %
CD3+CD4+ CELLS/CD3+CD8+ CLL SPEC: 0.78 RATIO
CD3+CD8+ CELLS # SPEC: 688 /UL
CD3+CD8+ CELLS NFR BLD: 42 %
CHOLEST SERPL-MCNC: 218 MG/DL
COLOR: YELLOW
GLUCOSE QUALITATIVE U: NEGATIVE
HCV AB SER QL: NONREACTIVE
HCV S/CO RATIO: 0.14 S/CO
HDLC SERPL-MCNC: 61 MG/DL
HIV1 RNA # SERPL NAA+PROBE: 78
HIV1 RNA # SERPL NAA+PROBE: ABNORMAL
HYALINE CASTS: 0 /LPF
KETONES URINE: NORMAL
LDLC SERPL CALC-MCNC: 137 MG/DL
LEUKOCYTE ESTERASE URINE: NEGATIVE
MICROSCOPIC-UA: NORMAL
NITRITE URINE: NEGATIVE
NONHDLC SERPL-MCNC: 156 MG/DL
PH URINE: 5.5
PROTEIN URINE: NORMAL
RED BLOOD CELLS URINE: 2 /HPF
SPECIFIC GRAVITY URINE: 1.03
SQUAMOUS EPITHELIAL CELLS: 0 /HPF
T PALLIDUM AB SER QL IA: POSITIVE
TRIGL SERPL-MCNC: 100 MG/DL
UROBILINOGEN URINE: NORMAL
VIRAL LOAD INTERP: NORMAL
VIRAL LOAD LOG: 1.89
WHITE BLOOD CELLS URINE: 1 /HPF

## 2022-08-08 ENCOUNTER — NON-APPOINTMENT (OUTPATIENT)
Age: 67
End: 2022-08-08

## 2022-08-09 LAB
ALBUMIN SERPL ELPH-MCNC: 4.7 G/DL
ALP BLD-CCNC: 80 U/L
ALT SERPL-CCNC: 31 U/L
AMYLASE/CREAT SERPL: 52 U/L
ANION GAP SERPL CALC-SCNC: 13 MMOL/L
AST SERPL-CCNC: 21 U/L
BASOPHILS # BLD AUTO: 0.03 K/UL
BASOPHILS NFR BLD AUTO: 0.4 %
BILIRUB SERPL-MCNC: 0.3 MG/DL
BUN SERPL-MCNC: 19 MG/DL
C TRACH RRNA SPEC QL NAA+PROBE: NOT DETECTED
CALCIUM SERPL-MCNC: 9.7 MG/DL
CHLORIDE SERPL-SCNC: 104 MMOL/L
CO2 SERPL-SCNC: 25 MMOL/L
CREAT SERPL-MCNC: 1.54 MG/DL
EGFR: 49 ML/MIN/1.73M2
EOSINOPHIL # BLD AUTO: 0.04 K/UL
EOSINOPHIL NFR BLD AUTO: 0.6 %
ESTIMATED AVERAGE GLUCOSE: 140 MG/DL
GLUCOSE SERPL-MCNC: 125 MG/DL
HBA1C MFR BLD HPLC: 6.5 %
HCT VFR BLD CALC: 45.5 %
HGB BLD-MCNC: 14.8 G/DL
IMM GRANULOCYTES NFR BLD AUTO: 0.1 %
LPL SERPL-CCNC: 22 U/L
LYMPHOCYTES # BLD AUTO: 1.69 K/UL
LYMPHOCYTES NFR BLD AUTO: 23.8 %
M TB IFN-G BLD-IMP: NEGATIVE
MAN DIFF?: NORMAL
MCHC RBC-ENTMCNC: 30.6 PG
MCHC RBC-ENTMCNC: 32.5 GM/DL
MCV RBC AUTO: 94.2 FL
MONOCYTES # BLD AUTO: 0.68 K/UL
MONOCYTES NFR BLD AUTO: 9.6 %
N GONORRHOEA RRNA SPEC QL NAA+PROBE: NOT DETECTED
NEUTROPHILS # BLD AUTO: 4.64 K/UL
NEUTROPHILS NFR BLD AUTO: 65.5 %
PLATELET # BLD AUTO: 253 K/UL
POTASSIUM SERPL-SCNC: 5 MMOL/L
PROT SERPL-MCNC: 7 G/DL
QUANTIFERON TB PLUS MITOGEN MINUS NIL: >10 IU/ML
QUANTIFERON TB PLUS NIL: 0.03 IU/ML
QUANTIFERON TB PLUS TB1 MINUS NIL: 0 IU/ML
QUANTIFERON TB PLUS TB2 MINUS NIL: 0 IU/ML
RBC # BLD: 4.83 M/UL
RBC # FLD: 14.1 %
SODIUM SERPL-SCNC: 142 MMOL/L
SOURCE AMPLIFICATION: NORMAL
WBC # FLD AUTO: 7.09 K/UL

## 2022-08-15 ENCOUNTER — NON-APPOINTMENT (OUTPATIENT)
Age: 67
End: 2022-08-15

## 2022-08-16 ENCOUNTER — NON-APPOINTMENT (OUTPATIENT)
Age: 67
End: 2022-08-16

## 2022-08-21 PROBLEM — N40.1 BPH ASSOCIATED WITH NOCTURIA: Status: ACTIVE | Noted: 2020-06-17

## 2022-08-21 PROBLEM — R39.198 DIFFICULTY URINATING: Status: ACTIVE | Noted: 2017-08-04

## 2022-08-21 PROBLEM — R19.7 DIARRHEA: Status: ACTIVE | Noted: 2022-08-21

## 2022-08-21 NOTE — REVIEW OF SYSTEMS
[SOB on Exertion] : shortness of breath during exertion [Negative] : Heme/Lymph [FreeTextEntry2] : night sweats if takes pill in the morning [FreeTextEntry3] : last eye exam  - 8-9 months   has some issues' when moves eyes feels funny- hard to focus in "  does a lot of reading  [FreeTextEntry4] : hearing is fine    goes to dentist regularly  had to have some extractions  broken roots [FreeTextEntry5] : went to one of test in [place] in [facility]  echo test  told heart muscle is fine  sent for CT of heart but shortage of iv  contrast [FreeTextEntry6] : walks constantly [FreeTextEntry7] : normal bowel movement [FreeTextEntry8] : sees Dr Castillo  seeing medicine for that  [de-identified] : memory not as good

## 2022-08-21 NOTE — ASSESSMENT
[FreeTextEntry1] : \par 67 yo male here for follow up for his HIV. History is also significant for elevated Hgb A1c, BPH, GERD, chest discomfort, and hypertension\par \par A/P\par \par HIV\par  Patient claims he hasn't missed a pill - this concerns me because he is still detectable. According to his resistance testing he should have responded to current treatment , unless there is some reason he isn't absorbing his pills - as he claims. Pt stopped tums but still detectable. Can repeat resistance testing but that wasn't the problem in the past.It could also be from his vitamins that blocks absorption but he claims he isn't taking them anymore\par Pt is now willing to do the injectables Will repeat resistance testing before we switch to this. Will also check HLA \par Reviewing med list , after patient left I see pepcid listed, will review with patient to see if he is taking " pepcid COMPLETE -" as this has calicum salts in it. Pepcid is ok . This could block absorption of the Biktarvy\par \par Pt asked but doesn't seem to be  interaction with viagra and cialis  and biktarvy \par \par \par UROLOGY\par needs to follow up with urology- pt with crystalluria and describes sediment in his urine \par Pt is now on meds for his prostate with some improvement in sxs\par will check PSA\par \par PSYCH\par I really think the patient would benefit from counseling. 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\par \par \par ELEVATED CREATININE\par pt needs to see nephrologist for elevated creatinine and for calcium oxylate crystals\par \par \par Health care Maintenance\par discussed hyperlipidemia with patient - he will try diet at first.\par refuses flu shot\par refuses covid shot- believes he has had Covid twice\par \par #CHEST PAIN\par NEEDS TO SEE CARDIOLOGY\par Having left chest sensation\par pops aspirin for fear that its cardiac\par exercise - doesn't do much- discussed the need for exercise\par pt wouldn't do exercise stress test because wouldn't wear the mandatory mask\par BP is also quite high today and discussed need for BP meds- he is not taking them \par pt now seeing a cardiologist at [facility] \par \par #GASTROENTEROLOGY\par NEEDS COLONOSCOPY\par Has indigestion\par takes many aspirins \par never tested for H pylori\par has abdominal pain\par stressed need to see gastroenterologist\par \par #HTN\par had agreed to start amlodipine - low dose for BP\par but isn't taking  did see cardiologist \par \par # paronychia\par tried keflex  \par will send to podiatrist\par take probioitcs  with antibioitcs \par \par #hyperlipidemia\par agrees to every other day crestor \par \par \par I do think pt would benefit from the injectable meds\par will need to be undetectable to start\par WIll double check about the pepcid vs pepcid complete \par it still detectable will have patient see my colleague for a second opinion\par \par . \par \par \par  \par \par \par \par \par \par \par \par

## 2022-08-21 NOTE — PHYSICAL EXAM
[General Appearance - Alert] : alert [General Appearance - In No Acute Distress] : in no acute distress [Sclera] : the sclera and conjunctiva were normal [PERRL With Normal Accommodation] : pupils were equal in size, round, reactive to light [Extraocular Movements] : extraocular movements were intact [Outer Ear] : the ears and nose were normal in appearance [Oropharynx] : the oropharynx was normal with no thrush [Neck Appearance] : the appearance of the neck was normal [Neck Cervical Mass (___cm)] : no neck mass was observed [Jugular Venous Distention Increased] : there was no jugular-venous distention [Thyroid Diffuse Enlargement] : the thyroid was not enlarged [Auscultation Breath Sounds / Voice Sounds] : lungs were clear to auscultation bilaterally [Heart Rate And Rhythm] : heart rate was normal and rhythm regular [Heart Sounds Gallop] : no gallops [Heart Sounds] : normal S1 and S2 [Murmurs] : no murmurs [Heart Sounds Pericardial Friction Rub] : no pericardial rub [Full Pulse] : the pedal pulses are present [Edema] : there was no peripheral edema [Bowel Sounds] : normal bowel sounds [Abdomen Soft] : soft [Abdomen Tenderness] : non-tender [Abdomen Mass (___ Cm)] : no abdominal mass palpated [Costovertebral Angle Tenderness] : no CVA tenderness [No Palpable Adenopathy] : no palpable adenopathy [Musculoskeletal - Swelling] : no joint swelling [Nail Clubbing] : no clubbing  or cyanosis of the fingernails [Motor Tone] : muscle strength and tone were normal [Skin Color & Pigmentation] : normal skin color and pigmentation [] : no rash [Deep Tendon Reflexes (DTR)] : deep tendon reflexes were 2+ and symmetric [Sensation] : the sensory exam was normal to light touch and pinprick [No Focal Deficits] : no focal deficits [Oriented To Time, Place, And Person] : oriented to person, place, and time [Affect] : the affect was normal [FreeTextEntry1] : paronychia of toe of left foot

## 2022-08-21 NOTE — HISTORY OF PRESENT ILLNESS
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

## 2022-08-25 ENCOUNTER — NON-APPOINTMENT (OUTPATIENT)
Age: 67
End: 2022-08-25

## 2022-09-26 ENCOUNTER — NON-APPOINTMENT (OUTPATIENT)
Age: 67
End: 2022-09-26

## 2022-09-27 ENCOUNTER — NON-APPOINTMENT (OUTPATIENT)
Age: 67
End: 2022-09-27

## 2022-09-27 ENCOUNTER — APPOINTMENT (OUTPATIENT)
Dept: INFECTIOUS DISEASE | Facility: CLINIC | Age: 67
End: 2022-09-27

## 2022-09-27 VITALS
WEIGHT: 206 LBS | BODY MASS INDEX: 28.84 KG/M2 | SYSTOLIC BLOOD PRESSURE: 179 MMHG | HEART RATE: 86 BPM | HEIGHT: 71 IN | DIASTOLIC BLOOD PRESSURE: 110 MMHG | RESPIRATION RATE: 16 BRPM | TEMPERATURE: 97.5 F | OXYGEN SATURATION: 97 %

## 2022-09-27 VITALS — DIASTOLIC BLOOD PRESSURE: 97 MMHG | SYSTOLIC BLOOD PRESSURE: 158 MMHG

## 2022-09-27 DIAGNOSIS — Z23 ENCOUNTER FOR IMMUNIZATION: ICD-10-CM

## 2022-09-27 PROCEDURE — 90611 SMALLPOX&MONKEYPOX VAC 0.5ML: CPT | Mod: NC,GY

## 2022-09-27 PROCEDURE — 90471 IMMUNIZATION ADMIN: CPT

## 2022-09-27 PROCEDURE — 99215 OFFICE O/P EST HI 40 MIN: CPT | Mod: 25

## 2022-09-27 RX ORDER — BICTEGRAVIR SODIUM, EMTRICITABINE, AND TENOFOVIR ALAFENAMIDE FUMARATE 50; 200; 25 MG/1; MG/1; MG/1
50-200-25 TABLET ORAL
Qty: 90 | Refills: 1 | Status: DISCONTINUED | COMMUNITY
Start: 2019-10-23 | End: 2022-09-27

## 2022-09-27 NOTE — ASSESSMENT
[FreeTextEntry1] : HIV diagnosed 2008, treated at [facility]\par Initially on Isentress and Truvada\par Had Diarrhea and Vomiting- took 2 years to adapt\par Oct 2019 changed to Biktarvy\par GI symproms were better after switch\par No co-pays\par Reviewed all past resistance tests including INSTImpolymorphisms, no resistance predicted to current regimen\par No MVIs, switched off TUMs to Pepcid 1 year ago. no difference in VL pattern.\par Not missing any doses- took late some days, now carries pills with him.\par No evolution of [place] or secondary mutations since 2016.\par Had GI work-up\par Feels medicine creates heartburn\par On amlodipine, on rosuvastatin\par Was on flomax- off now.\par 2 possibilities: 1) Clonal origen of replicating virus versus 2) lack of potency of tehrapy\par Switch to Symtuza/tivicay- repeat VL in 4 weeks, if no change supports Clonal origin and would continue Symtuza\par If VL suppressed at 4 weeks, continue Symtuza + tivicay\par Check VL and Cystatin C GFR., 1st MP vax today\par RTC 4 weeks, VL and SMA\par

## 2022-09-27 NOTE — HISTORY OF PRESENT ILLNESS
[Sexually Active] : The patient is sexually active [Both] : with men and women [FreeTextEntry1] : HIV diagnosed in \par On Biktarvy\par Takes meds at 6PM - feels best having meds at 6PM with dinner\par He feels numb in his fingertips when VL goes up\par He works as a , he notices he is more forgetful\par Has not had a neuropsych testing\par Has had dyspepsia, took Tums frequently, switched to pepcid for better control due to potential DDI with INSTI\par \par Has had LL viremia\par  [Monogamous] : is not monogamous [Condom Use] : not using condoms

## 2022-09-27 NOTE — REVIEW OF SYSTEMS
[Negative] : Heme/Lymph [___ # of Missed Doses in The Past Week] : [unfilled] doses missed in the past week  [FreeTextEntry2] : Memory issues [FreeTextEntry8] : Heartburn, especially after taking ARVs

## 2022-09-28 LAB
CYSTATIN C SERPL-MCNC: 1.07 MG/L
GFR/BSA.PRED SERPLBLD CYS-BASED-ARV: 69 ML/MIN/1.73M2
HIV1 RNA # SERPL NAA+PROBE: 47
HIV1 RNA # SERPL NAA+PROBE: ABNORMAL
VIRAL LOAD INTERP: NORMAL
VIRAL LOAD LOG: 1.67

## 2022-10-27 ENCOUNTER — APPOINTMENT (OUTPATIENT)
Dept: INFECTIOUS DISEASE | Facility: CLINIC | Age: 67
End: 2022-10-27

## 2022-10-27 DIAGNOSIS — Z00.00 ENCOUNTER FOR GENERAL ADULT MEDICAL EXAMINATION W/OUT ABNORMAL FINDINGS: ICD-10-CM

## 2022-10-27 LAB
ALBUMIN SERPL ELPH-MCNC: 4.6 G/DL
ALP BLD-CCNC: 67 U/L
ALT SERPL-CCNC: 15 U/L
ANION GAP SERPL CALC-SCNC: 11 MMOL/L
AST SERPL-CCNC: 16 U/L
BILIRUB SERPL-MCNC: 0.4 MG/DL
BUN SERPL-MCNC: 18 MG/DL
CALCIUM SERPL-MCNC: 9.6 MG/DL
CHLORIDE SERPL-SCNC: 104 MMOL/L
CO2 SERPL-SCNC: 28 MMOL/L
CREAT SERPL-MCNC: 1.39 MG/DL
EGFR: 55 ML/MIN/1.73M2
GLUCOSE SERPL-MCNC: 140 MG/DL
POTASSIUM SERPL-SCNC: 5.1 MMOL/L
PROT SERPL-MCNC: 7.1 G/DL
SODIUM SERPL-SCNC: 143 MMOL/L

## 2022-10-27 PROCEDURE — 90471 IMMUNIZATION ADMIN: CPT

## 2022-10-27 PROCEDURE — 90611 SMALLPOX&MONKEYPOX VAC 0.5ML: CPT | Mod: NC,GY

## 2022-10-28 LAB
HIV1 RNA # SERPL NAA+PROBE: 110
HIV1 RNA # SERPL NAA+PROBE: ABNORMAL
VIRAL LOAD INTERP: NORMAL
VIRAL LOAD LOG: 2.04

## 2022-11-03 ENCOUNTER — RX RENEWAL (OUTPATIENT)
Age: 67
End: 2022-11-03

## 2022-11-11 ENCOUNTER — APPOINTMENT (OUTPATIENT)
Dept: UROLOGY | Facility: CLINIC | Age: 67
End: 2022-11-11

## 2022-12-06 ENCOUNTER — RX RENEWAL (OUTPATIENT)
Age: 67
End: 2022-12-06

## 2023-01-11 ENCOUNTER — NON-APPOINTMENT (OUTPATIENT)
Age: 68
End: 2023-01-11

## 2023-01-13 ENCOUNTER — NON-APPOINTMENT (OUTPATIENT)
Age: 68
End: 2023-01-13

## 2023-01-25 ENCOUNTER — LABORATORY RESULT (OUTPATIENT)
Age: 68
End: 2023-01-25

## 2023-01-25 ENCOUNTER — APPOINTMENT (OUTPATIENT)
Dept: INFECTIOUS DISEASE | Facility: CLINIC | Age: 68
End: 2023-01-25

## 2023-01-25 LAB
25(OH)D3 SERPL-MCNC: 22.2 NG/ML
ALBUMIN SERPL ELPH-MCNC: 4.6 G/DL
ALP BLD-CCNC: 68 U/L
ALT SERPL-CCNC: 16 U/L
ANION GAP SERPL CALC-SCNC: 11 MMOL/L
APPEARANCE: CLEAR
AST SERPL-CCNC: 17 U/L
BACTERIA: NEGATIVE
BASOPHILS # BLD AUTO: 0.03 K/UL
BASOPHILS NFR BLD AUTO: 0.5 %
BILIRUB SERPL-MCNC: 0.5 MG/DL
BILIRUBIN URINE: NEGATIVE
BLOOD URINE: NEGATIVE
BUN SERPL-MCNC: 19 MG/DL
CALCIUM SERPL-MCNC: 9.7 MG/DL
CHLORIDE SERPL-SCNC: 104 MMOL/L
CHOLEST SERPL-MCNC: 220 MG/DL
CO2 SERPL-SCNC: 27 MMOL/L
COLOR: YELLOW
CREAT SERPL-MCNC: 1.61 MG/DL
EGFR: 46 ML/MIN/1.73M2
EOSINOPHIL # BLD AUTO: 0.08 K/UL
EOSINOPHIL NFR BLD AUTO: 1.4 %
ESTIMATED AVERAGE GLUCOSE: 128 MG/DL
GLUCOSE QUALITATIVE U: NEGATIVE
GLUCOSE SERPL-MCNC: 132 MG/DL
HBA1C MFR BLD HPLC: 6.1 %
HCT VFR BLD CALC: 41.2 %
HDLC SERPL-MCNC: 63 MG/DL
HGB BLD-MCNC: 13.3 G/DL
HYALINE CASTS: 2 /LPF
IMM GRANULOCYTES NFR BLD AUTO: 0.2 %
KETONES URINE: NEGATIVE
LDLC SERPL CALC-MCNC: 140 MG/DL
LEUKOCYTE ESTERASE URINE: NEGATIVE
LYMPHOCYTES # BLD AUTO: 1.54 K/UL
LYMPHOCYTES NFR BLD AUTO: 27.3 %
MAN DIFF?: NORMAL
MCHC RBC-ENTMCNC: 31.1 PG
MCHC RBC-ENTMCNC: 32.3 GM/DL
MCV RBC AUTO: 96.5 FL
MICROSCOPIC-UA: NORMAL
MONOCYTES # BLD AUTO: 0.57 K/UL
MONOCYTES NFR BLD AUTO: 10.1 %
NEUTROPHILS # BLD AUTO: 3.42 K/UL
NEUTROPHILS NFR BLD AUTO: 60.5 %
NITRITE URINE: NEGATIVE
NONHDLC SERPL-MCNC: 158 MG/DL
PH URINE: 6
PLATELET # BLD AUTO: 249 K/UL
POTASSIUM SERPL-SCNC: 4.7 MMOL/L
PROT SERPL-MCNC: 7 G/DL
PROTEIN URINE: ABNORMAL
PSA SERPL-MCNC: 0.44 NG/ML
RBC # BLD: 4.27 M/UL
RBC # FLD: 13.9 %
RED BLOOD CELLS URINE: 1 /HPF
SODIUM SERPL-SCNC: 142 MMOL/L
SPECIFIC GRAVITY URINE: 1.03
SQUAMOUS EPITHELIAL CELLS: 0 /HPF
TRIGL SERPL-MCNC: 87 MG/DL
UROBILINOGEN URINE: NORMAL
WBC # FLD AUTO: 5.65 K/UL
WHITE BLOOD CELLS URINE: 0 /HPF

## 2023-01-26 LAB
C TRACH RRNA SPEC QL NAA+PROBE: NOT DETECTED
CD3 CELLS # BLD: 1207 CELLS/UL
CD3 CELLS NFR BLD: 76 %
CD3+CD4+ CELLS # BLD: 554 CELLS/UL
CD3+CD4+ CELLS NFR BLD: 35 %
CD3+CD4+ CELLS/CD3+CD8+ CLL SPEC: 0.86 RATIO
CD3+CD8+ CELLS # SPEC: 644 CELLS/UL
CD3+CD8+ CELLS NFR BLD: 40 %
HIV1 RNA # SERPL NAA+PROBE: ABNORMAL
HIV1 RNA # SERPL NAA+PROBE: ABNORMAL COPIES/ML
N GONORRHOEA RRNA SPEC QL NAA+PROBE: NOT DETECTED
SOURCE AMPLIFICATION: NORMAL
VIRAL LOAD INTERP: NORMAL
VIRAL LOAD LOG: ABNORMAL LG COP/ML

## 2023-01-27 ENCOUNTER — NON-APPOINTMENT (OUTPATIENT)
Age: 68
End: 2023-01-27

## 2023-01-31 LAB
M TB IFN-G BLD-IMP: NEGATIVE
QUANTIFERON TB PLUS MITOGEN MINUS NIL: 8.03 IU/ML
QUANTIFERON TB PLUS NIL: 0.03 IU/ML
QUANTIFERON TB PLUS TB1 MINUS NIL: 0 IU/ML
QUANTIFERON TB PLUS TB2 MINUS NIL: 0 IU/ML
T PALLIDUM AB SER QL IA: POSITIVE

## 2023-02-02 ENCOUNTER — RX RENEWAL (OUTPATIENT)
Age: 68
End: 2023-02-02

## 2023-02-02 ENCOUNTER — APPOINTMENT (OUTPATIENT)
Dept: INFECTIOUS DISEASE | Facility: CLINIC | Age: 68
End: 2023-02-02
Payer: MEDICARE

## 2023-02-02 VITALS
HEIGHT: 71 IN | WEIGHT: 208 LBS | BODY MASS INDEX: 29.12 KG/M2 | TEMPERATURE: 98.9 F | HEART RATE: 84 BPM | OXYGEN SATURATION: 95 % | SYSTOLIC BLOOD PRESSURE: 175 MMHG | DIASTOLIC BLOOD PRESSURE: 101 MMHG

## 2023-02-02 DIAGNOSIS — R41.3 OTHER AMNESIA: ICD-10-CM

## 2023-02-02 PROCEDURE — 99214 OFFICE O/P EST MOD 30 MIN: CPT

## 2023-02-02 NOTE — HISTORY OF PRESENT ILLNESS
[Sexually Active] : The patient is sexually active [Both] : with men and women [FreeTextEntry1] : HIV diagnosed in \par On Symtuza/Tivicay\par Takes meds at 4-6PM - feels best having meds at 6PM with dinner\par He works as a , he notices he is more forgetful\par Has not had a neuropsych testing\par Has had dyspepsia, took Tums frequently, switched to pepcid for better control due to potential DDI with INSTI\par \par Has had LL viremia, last measure Jan 2023 was < 30 after Rx intensification\par \par Has high BP\par \par Was on meds for BPH\par When he took meds- had pins and needles \par Stopped when he interrupted treatments [Monogamous] : is not monogamous

## 2023-02-02 NOTE — PHYSICAL EXAM
[General Appearance - Alert] : alert [General Appearance - In No Acute Distress] : in no acute distress [Sclera] : the sclera and conjunctiva were normal [PERRL With Normal Accommodation] : pupils were equal in size, round, reactive to light [Extraocular Movements] : extraocular movements were intact [Outer Ear] : the ears and nose were normal in appearance [Oropharynx] : the oropharynx was normal with no thrush [Neck Appearance] : the appearance of the neck was normal [Neck Cervical Mass (___cm)] : no neck mass was observed [Jugular Venous Distention Increased] : there was no jugular-venous distention [Thyroid Diffuse Enlargement] : the thyroid was not enlarged [Auscultation Breath Sounds / Voice Sounds] : lungs were clear to auscultation bilaterally [Heart Rate And Rhythm] : heart rate was normal and rhythm regular [Heart Sounds] : normal S1 and S2 [Heart Sounds Gallop] : no gallops [Murmurs] : no murmurs [Heart Sounds Pericardial Friction Rub] : no pericardial rub [Full Pulse] : the pedal pulses are present [Edema] : there was no peripheral edema [Bowel Sounds] : normal bowel sounds [Abdomen Soft] : soft [Abdomen Tenderness] : non-tender [Abdomen Mass (___ Cm)] : no abdominal mass palpated [Costovertebral Angle Tenderness] : no CVA tenderness [No Palpable Adenopathy] : no palpable adenopathy [Musculoskeletal - Swelling] : no joint swelling [Nail Clubbing] : no clubbing  or cyanosis of the fingernails [Motor Tone] : muscle strength and tone were normal [Skin Color & Pigmentation] : normal skin color and pigmentation [] : no rash [Sensation] : the sensory exam was normal to light touch and pinprick [Deep Tendon Reflexes (DTR)] : deep tendon reflexes were 2+ and symmetric [No Focal Deficits] : no focal deficits [Oriented To Time, Place, And Person] : oriented to person, place, and time [Affect] : the affect was normal

## 2023-02-02 NOTE — ASSESSMENT
[FreeTextEntry1] : HIV diagnosed 2008, treated at [facility]\par Initially on Isentress and Truvada\par Had Diarrhea and Vomiting- took 2 years to adapt\par Oct 2019 changed to Biktarvy\par Now on Symtuza/Tivicay\par GI symptoms - terrible heartburn. Using Pepcid Complete with Mg hydroxide and Ca++- uses all the time- 4-5 X a day\par Will try omeprazole, test for H pylori\par If no improvement on PPI and H pylori negative, could conside Cabenuva direct injection as he cannot tolerate pills \par Reviewed all past resistance tests including INSTI polymorphisms, no resistance predicted to Biktarvy but had persistent LL viremia\par Gets phlegm at times\par Gets pustualr rashes on hands after cuts\par Regimen intensified last visit (Sept 2022) to Symtuza/Tivicay, VL in Oct was 110, but Jan 25,2023 was <30 for the first time since 2016. Continue current regimen and recheck in 3 months to confirm patter.\par If remains suppressed it speaks against clonal nature of past LL viremia. If rebounds may support clonal nature.\par No MVIs, switched off TUMs to Pepcid 1 year ago. no difference in VL pattern.\par Not missing any doses- took late some days, now carries pills with him.\par No evolution of Clinton or secondary mutations since 2016.\par Had GI work-up\par Feels medicine creates heartburn\par On amlodipine, on rosuvastatin\par Was on flomax- off now.\par Labs from 1/25/2023: \par Low vitamin D, can supplement with 50K weekly \par CKD on labs, eGFR 46, dehydrated by Sp gravity\par non-, no change on new RX, on rosuvastatin 5mg (boosted), may increase to 10 mg\par Borderline DM, HgA1c down to 6.1\par Memory issues, refer for neuropsych testing\par Colonoscopy, + FH- has had within 10 years\par Optho: friend is optho goes regularly\par Dental: Going, needs extraction\par Vaccines: completed MPox series, has gotten COVID 4 times never vaccinated, defers flu shot, does not want pneumovax\par HTN - restart amlodipine, adjust up as tolerates\par \par

## 2023-02-03 ENCOUNTER — RX RENEWAL (OUTPATIENT)
Age: 68
End: 2023-02-03

## 2023-02-03 ENCOUNTER — NON-APPOINTMENT (OUTPATIENT)
Age: 68
End: 2023-02-03

## 2023-02-05 LAB
H PYLORI AB SER-ACNC: 6 UNITS
H PYLORI IGA SER-ACNC: 17.2 UNITS

## 2023-05-08 ENCOUNTER — APPOINTMENT (OUTPATIENT)
Dept: INFECTIOUS DISEASE | Facility: CLINIC | Age: 68
End: 2023-05-08

## 2023-05-22 ENCOUNTER — NON-APPOINTMENT (OUTPATIENT)
Age: 68
End: 2023-05-22

## 2023-05-24 ENCOUNTER — RX RENEWAL (OUTPATIENT)
Age: 68
End: 2023-05-24

## 2023-05-26 ENCOUNTER — APPOINTMENT (OUTPATIENT)
Dept: INFECTIOUS DISEASE | Facility: CLINIC | Age: 68
End: 2023-05-26

## 2023-05-26 LAB
ALBUMIN SERPL ELPH-MCNC: 4.4 G/DL
ALP BLD-CCNC: 106 U/L
ALT SERPL-CCNC: 11 U/L
ANION GAP SERPL CALC-SCNC: 14 MMOL/L
AST SERPL-CCNC: 14 U/L
BILIRUB SERPL-MCNC: 0.4 MG/DL
BUN SERPL-MCNC: 17 MG/DL
CALCIUM SERPL-MCNC: 9.4 MG/DL
CD3 CELLS # BLD: 1307 CELLS/UL
CD3 CELLS NFR BLD: 74 %
CD3+CD4+ CELLS # BLD: 554 CELLS/UL
CD3+CD4+ CELLS NFR BLD: 31 %
CD3+CD4+ CELLS/CD3+CD8+ CLL SPEC: 0.75 RATIO
CD3+CD8+ CELLS # SPEC: 740 CELLS/UL
CD3+CD8+ CELLS NFR BLD: 42 %
CHLORIDE SERPL-SCNC: 104 MMOL/L
CO2 SERPL-SCNC: 24 MMOL/L
CREAT SERPL-MCNC: 1.34 MG/DL
CYSTATIN C SERPL-MCNC: 1.16 MG/L
EGFR: 58 ML/MIN/1.73M2
ESTIMATED AVERAGE GLUCOSE: 146 MG/DL
GFR/BSA.PRED SERPLBLD CYS-BASED-ARV: 62 ML/MIN/1.73M2
GLUCOSE SERPL-MCNC: 121 MG/DL
HBA1C MFR BLD HPLC: 6.7 %
POTASSIUM SERPL-SCNC: 4.4 MMOL/L
PROT SERPL-MCNC: 7 G/DL
SODIUM SERPL-SCNC: 142 MMOL/L

## 2023-05-28 LAB
HIV1 RNA # SERPL NAA+PROBE: 54
HIV1 RNA # SERPL NAA+PROBE: ABNORMAL
VIRAL LOAD INTERP: NORMAL
VIRAL LOAD LOG: 1.74

## 2023-05-30 ENCOUNTER — APPOINTMENT (OUTPATIENT)
Dept: INFECTIOUS DISEASE | Facility: CLINIC | Age: 68
End: 2023-05-30
Payer: MEDICARE

## 2023-05-30 VITALS
SYSTOLIC BLOOD PRESSURE: 141 MMHG | HEART RATE: 83 BPM | HEIGHT: 71 IN | OXYGEN SATURATION: 97 % | DIASTOLIC BLOOD PRESSURE: 95 MMHG | TEMPERATURE: 98.1 F

## 2023-05-30 PROCEDURE — 99214 OFFICE O/P EST MOD 30 MIN: CPT

## 2023-05-30 NOTE — ASSESSMENT
[FreeTextEntry1] : HIV diagnosed 2008, treated at [facility]\par Initially on Isentress and Truvada\par Had Diarrhea and Vomiting- took 2 years to adapt\par Feels more awake\par Oct 2019 changed to Biktarvy\par Now on Symtuza/Tivicay\par GI symptoms - terrible heartburn. Using Pepcid Complete with Mg hydroxide and Ca++- uses all the time- 4-5 X a day\par VL remains LL- if wants to can stop tivicay, wants to continue\par Will try omeprazole, test for H pylori\par If no improvement on PPI and H pylori negative, could consider Cabenuva direct injection as he cannot tolerate pills \par Reviewed all past resistance tests including INSTI polymorphisms, no resistance predicted to Biktarvy but had persistent LL viremia. Concern for Cabenuva 1) VL not sustained < 30; 2) Low genetic barrier combination in setting of LL viremia; 3) no PI block so active virus will be released\par Gets phlegm at times\par Gets pustular rashes on hands after cuts\par Regimen intensified last visit (Sept 2022) to Symtuza/Tivicay, VL in Oct was 110, but Jan 25,2023 was <30 for the first time since 2016. Continue current regimen and recheck in 3 months to confirm patter.\par If remains suppressed it speaks against clonal nature of past LL viremia. If rebounds may support clonal nature. So far has LL viremia again, may be clonal.\par No MVIs, switched off TUMs to Pepcid 1 year ago. no difference in VL pattern.\par Not missing any doses- took late some days, now carries pills with him.\par No evolution of Clinton or secondary mutations since 2016.\par Had GI work-up\par Feels medicine creates heartburn\par On amlodipine, on rosuvastatin\par Was on flomax- off now. made arm tingle\par Labs from 1/25/2023: \par Low vitamin D, can supplement with 50K weekly \par CKD on labs, eGFR 46, dehydrated by Sp gravity\par non-, no change on new RX, on rosuvastatin 5mg (boosted), may increase to 10 mg\par Borderline DM, HgA1c increased to 6.7 from  6.1- intolerant of metformin, ruby work on healthy eating (eats a lot of sugar), will try to cut back\par Memory issues, refer for neuropsych testing\par Colonoscopy, + FH- has had within 10 years\par Optho: friend is optho goes regularly\par Dental: Going, needs extraction\par Vaccines:has gotten COVID 4 times never vaccinated, defers flu shot, does not want pneumovax\par HTN - restart amlodipine, adjust up as tolerates\par Check Hep A/B serology, post Hep B vaccination\par \par

## 2023-05-30 NOTE — HISTORY OF PRESENT ILLNESS
[FreeTextEntry1] : HIV diagnosed in \par On Symtuza/Tivicay\par Takes meds at 4-6PM - feels best having meds at 6PM with dinner\par He works as a , he notices he is more forgetful\par Has not had a neuropsych testing\par Has had dyspepsia, took Tums frequently, switched to pepcid for better control due to potential DDI with INSTI\par \par Has had LL viremia, last measure Jan 2023 was < 30 after Rx intensification, 54 on latest reading May 2023\par \par Has high BP\par \par Was on meds for BPH\par When he took meds- had pins and needles \par Stopped when he interrupted treatments [Sexually Active] : The patient is not sexually active

## 2023-05-31 DIAGNOSIS — Z78.9 OTHER SPECIFIED HEALTH STATUS: ICD-10-CM

## 2023-05-31 LAB
H PYLORI AB SER-ACNC: 5.4 UNITS
H PYLORI IGA SER-ACNC: 31.5 UNITS
HBV CORE IGG+IGM SER QL: NONREACTIVE
HBV SURFACE AB SER QL: REACTIVE
HBV SURFACE AG SER QL: NONREACTIVE
HEPATITIS A IGG ANTIBODY: NONREACTIVE

## 2023-06-02 ENCOUNTER — NON-APPOINTMENT (OUTPATIENT)
Age: 68
End: 2023-06-02

## 2023-07-18 ENCOUNTER — RX RENEWAL (OUTPATIENT)
Age: 68
End: 2023-07-18

## 2023-07-19 ENCOUNTER — NON-APPOINTMENT (OUTPATIENT)
Age: 68
End: 2023-07-19

## 2023-07-20 ENCOUNTER — NON-APPOINTMENT (OUTPATIENT)
Age: 68
End: 2023-07-20

## 2023-07-24 ENCOUNTER — RX RENEWAL (OUTPATIENT)
Age: 68
End: 2023-07-24

## 2023-07-24 RX ORDER — OMEPRAZOLE 40 MG/1
40 CAPSULE, DELAYED RELEASE ORAL
Qty: 90 | Refills: 0 | Status: ACTIVE | COMMUNITY
Start: 2023-02-02 | End: 1900-01-01

## 2023-07-31 ENCOUNTER — NON-APPOINTMENT (OUTPATIENT)
Age: 68
End: 2023-07-31

## 2023-10-16 ENCOUNTER — RX RENEWAL (OUTPATIENT)
Age: 68
End: 2023-10-16

## 2023-10-19 ENCOUNTER — RX RENEWAL (OUTPATIENT)
Age: 68
End: 2023-10-19

## 2023-10-23 ENCOUNTER — RX RENEWAL (OUTPATIENT)
Age: 68
End: 2023-10-23

## 2023-11-09 ENCOUNTER — RX RENEWAL (OUTPATIENT)
Age: 68
End: 2023-11-09

## 2023-11-20 ENCOUNTER — NON-APPOINTMENT (OUTPATIENT)
Age: 68
End: 2023-11-20

## 2023-12-12 ENCOUNTER — RX RENEWAL (OUTPATIENT)
Age: 68
End: 2023-12-12

## 2024-01-29 ENCOUNTER — NON-APPOINTMENT (OUTPATIENT)
Age: 69
End: 2024-01-29

## 2024-01-30 ENCOUNTER — APPOINTMENT (OUTPATIENT)
Dept: INFECTIOUS DISEASE | Facility: CLINIC | Age: 69
End: 2024-01-30

## 2024-02-01 ENCOUNTER — APPOINTMENT (OUTPATIENT)
Dept: INFECTIOUS DISEASE | Facility: CLINIC | Age: 69
End: 2024-02-01
Payer: MEDICARE

## 2024-02-01 ENCOUNTER — LABORATORY RESULT (OUTPATIENT)
Age: 69
End: 2024-02-01

## 2024-02-01 VITALS
SYSTOLIC BLOOD PRESSURE: 159 MMHG | HEIGHT: 71 IN | TEMPERATURE: 98.6 F | DIASTOLIC BLOOD PRESSURE: 82 MMHG | WEIGHT: 207 LBS | RESPIRATION RATE: 16 BRPM | BODY MASS INDEX: 28.98 KG/M2 | HEART RATE: 88 BPM | OXYGEN SATURATION: 97 %

## 2024-02-01 DIAGNOSIS — Z01.20 ENCOUNTER FOR DENTAL EXAMINATION AND CLEANING W/OUT ABNORMAL FINDINGS: ICD-10-CM

## 2024-02-01 DIAGNOSIS — E11.9 TYPE 2 DIABETES MELLITUS W/OUT COMPLICATIONS: ICD-10-CM

## 2024-02-01 DIAGNOSIS — Z01.89 ENCOUNTER FOR OTHER SPECIFIED SPECIAL EXAMINATIONS: ICD-10-CM

## 2024-02-01 DIAGNOSIS — Z01.00 ENCOUNTER FOR EXAMINATION OF EYES AND VISION W/OUT ABNORMAL FINDINGS: ICD-10-CM

## 2024-02-01 DIAGNOSIS — Z98.890 OTHER SPECIFIED POSTPROCEDURAL STATES: ICD-10-CM

## 2024-02-01 DIAGNOSIS — Z86.010 OTHER SPECIFIED POSTPROCEDURAL STATES: ICD-10-CM

## 2024-02-01 DIAGNOSIS — B20 HUMAN IMMUNODEFICIENCY VIRUS [HIV] DISEASE: ICD-10-CM

## 2024-02-01 PROCEDURE — 99214 OFFICE O/P EST MOD 30 MIN: CPT

## 2024-02-01 RX ORDER — CEPHALEXIN 500 MG/1
500 CAPSULE ORAL
Qty: 21 | Refills: 0 | Status: COMPLETED | COMMUNITY
Start: 2022-08-05 | End: 2024-02-01

## 2024-02-01 RX ORDER — TERAZOSIN 5 MG/1
5 CAPSULE ORAL TWICE DAILY
Qty: 60 | Refills: 0 | Status: ACTIVE | COMMUNITY
Start: 2024-02-01

## 2024-02-01 RX ORDER — TAMSULOSIN HYDROCHLORIDE 0.4 MG/1
0.4 CAPSULE ORAL
Qty: 180 | Refills: 0 | Status: COMPLETED | COMMUNITY
Start: 2022-02-11 | End: 2024-02-01

## 2024-02-01 RX ORDER — FAMOTIDINE 20 MG/1
20 TABLET, FILM COATED ORAL
Qty: 180 | Refills: 0 | Status: COMPLETED | COMMUNITY
Start: 2022-05-13 | End: 2024-02-01

## 2024-02-01 NOTE — ASSESSMENT
[FreeTextEntry1] : HIV on Symtuza/Tivicay Tolerating well, continue current meds. Initially on Isentress and Truvada Had Diarrhea and Vomiting- took 2 years to adapt Feels more awake Oct 2019 changed to Biktarvy Now on Symtuza/Tivicay GI symptoms - terrible heartburn. Using Pepcid Complete with Mg hydroxide and Ca++- uses all the time- 4-5 X a day VL remains LL- if wants to can stop tivicay, wants to continue Will try omeprazole, test for H pylori If no improvement on PPI and H pylori negative, could consider Cabenuva direct injection as he cannot tolerate pills Reviewed all past resistance tests including INSTI polymorphisms, no resistance predicted to Biktarvy but had persistent LL viremia. Concern for Cabenuva 1) VL not sustained < 30; 2) Low genetic barrier combination in setting of LL viremia; 3) no PI block so active virus will be released. No Evolution of Clinton despite LL viremia. He wants to try Cabenuva, has difficulty with pills due to frequent diarrhea and sensitive stomach Labs from May 2023 reviewed Will check CD4, VL, CBC, SMA, PSA, Lipids (6 hours- chicken/turkey), HgA1c, U/A Likely clonal virus- if get 2 serially suppressed can switch to Cabenuva- he would not like to risk transmission Vaccines: Defers vaccines, due for Hep A, PCV20 and Tdap HTN: on amlodipine Lipids on statin Colonoscopy: Had EGD and colonoscopy- had H pylori Ab+, had 1 polyps- no reports but not told of any pathology GERD- uses 20 mg omeprazole Dental: UTD Opth: not seeing dentist

## 2024-02-02 DIAGNOSIS — E55.9 VITAMIN D DEFICIENCY, UNSPECIFIED: ICD-10-CM

## 2024-02-02 LAB
25(OH)D3 SERPL-MCNC: 19.2 NG/ML
ALBUMIN SERPL ELPH-MCNC: 4.8 G/DL
ALP BLD-CCNC: 94 U/L
ALT SERPL-CCNC: 16 U/L
ANION GAP SERPL CALC-SCNC: 14 MMOL/L
APPEARANCE: CLEAR
AST SERPL-CCNC: 16 U/L
BACTERIA: NEGATIVE /HPF
BILIRUB SERPL-MCNC: 0.3 MG/DL
BILIRUBIN URINE: NEGATIVE
BLOOD URINE: NEGATIVE
BUN SERPL-MCNC: 25 MG/DL
CALCIUM SERPL-MCNC: 9.3 MG/DL
CAST: 0 /LPF
CHLORIDE SERPL-SCNC: 107 MMOL/L
CHOLEST SERPL-MCNC: 199 MG/DL
CO2 SERPL-SCNC: 22 MMOL/L
COLOR: YELLOW
CREAT SERPL-MCNC: 1.3 MG/DL
EGFR: 59 ML/MIN/1.73M2
EPITHELIAL CELLS: 0 /HPF
ESTIMATED AVERAGE GLUCOSE: 128 MG/DL
GLUCOSE QUALITATIVE U: NEGATIVE MG/DL
GLUCOSE SERPL-MCNC: 113 MG/DL
HBA1C MFR BLD HPLC: 6.1 %
HCT VFR BLD CALC: 36.8 %
HCV AB SER QL: NONREACTIVE
HCV S/CO RATIO: 0.18 S/CO
HDLC SERPL-MCNC: 69 MG/DL
HGB BLD-MCNC: 12.5 G/DL
HIV1 RNA # SERPL NAA+PROBE: ABNORMAL
HIV1 RNA # SERPL NAA+PROBE: ABNORMAL COPIES/ML
KETONES URINE: NEGATIVE MG/DL
LDLC SERPL CALC-MCNC: 119 MG/DL
LEUKOCYTE ESTERASE URINE: NEGATIVE
MCHC RBC-ENTMCNC: 32.1 PG
MCHC RBC-ENTMCNC: 34 GM/DL
MCV RBC AUTO: 94.4 FL
MICROSCOPIC-UA: NORMAL
NITRITE URINE: NEGATIVE
NONHDLC SERPL-MCNC: 130 MG/DL
PH URINE: 5.5
PLATELET # BLD AUTO: 213 K/UL
POTASSIUM SERPL-SCNC: 4.4 MMOL/L
PROT SERPL-MCNC: 6.8 G/DL
PROTEIN URINE: NEGATIVE MG/DL
PSA SERPL-MCNC: 0.34 NG/ML
RBC # BLD: 3.9 M/UL
RBC # FLD: 13.6 %
RED BLOOD CELLS URINE: 0 /HPF
SODIUM SERPL-SCNC: 143 MMOL/L
SPECIFIC GRAVITY URINE: 1.02
TRIGL SERPL-MCNC: 60 MG/DL
UROBILINOGEN URINE: 0.2 MG/DL
VIRAL LOAD INTERP: NORMAL
VIRAL LOAD LOG: ABNORMAL LG COP/ML
WBC # FLD AUTO: 6.59 K/UL
WHITE BLOOD CELLS URINE: 0 /HPF

## 2024-02-02 RX ORDER — CHOLECALCIFEROL (VITAMIN D3) 1250 MCG
1.25 MG CAPSULE ORAL
Qty: 4 | Refills: 3 | Status: ACTIVE | COMMUNITY
Start: 2024-02-02 | End: 1900-01-01

## 2024-02-05 LAB
M TB IFN-G BLD-IMP: NEGATIVE
QUANTIFERON TB PLUS MITOGEN MINUS NIL: >10 IU/ML
QUANTIFERON TB PLUS NIL: 0.02 IU/ML
QUANTIFERON TB PLUS TB1 MINUS NIL: 0 IU/ML
QUANTIFERON TB PLUS TB2 MINUS NIL: 0 IU/ML
T PALLIDUM AB SER QL IA: POSITIVE

## 2024-02-10 ENCOUNTER — RX RENEWAL (OUTPATIENT)
Age: 69
End: 2024-02-10

## 2024-02-10 RX ORDER — AMLODIPINE BESYLATE 2.5 MG/1
2.5 TABLET ORAL
Qty: 90 | Refills: 0 | Status: ACTIVE | COMMUNITY
Start: 2022-05-13 | End: 1900-01-01

## 2024-02-20 ENCOUNTER — LABORATORY RESULT (OUTPATIENT)
Age: 69
End: 2024-02-20

## 2024-02-20 ENCOUNTER — APPOINTMENT (OUTPATIENT)
Dept: INFECTIOUS DISEASE | Facility: CLINIC | Age: 69
End: 2024-02-20

## 2024-02-20 DIAGNOSIS — Z20.2 CONTACT WITH AND (SUSPECTED) EXPOSURE TO INFECTIONS WITH A PREDOMINANTLY SEXUAL MODE OF TRANSMISSION: ICD-10-CM

## 2024-02-20 LAB
HCV AB SER QL: NONREACTIVE
HCV S/CO RATIO: 0.14 S/CO

## 2024-02-21 LAB
C TRACH RRNA SPEC QL NAA+PROBE: NOT DETECTED
N GONORRHOEA RRNA SPEC QL NAA+PROBE: NOT DETECTED
SOURCE AMPLIFICATION: NORMAL
SOURCE ANAL: NORMAL
SOURCE ORAL: NORMAL

## 2024-02-22 LAB — T PALLIDUM AB SER QL IA: POSITIVE

## 2024-02-23 ENCOUNTER — NON-APPOINTMENT (OUTPATIENT)
Age: 69
End: 2024-02-23

## 2024-03-14 ENCOUNTER — RX RENEWAL (OUTPATIENT)
Age: 69
End: 2024-03-14

## 2024-03-29 RX ORDER — ROSUVASTATIN CALCIUM 5 MG/1
5 TABLET, FILM COATED ORAL
Qty: 45 | Refills: 0 | Status: ACTIVE | COMMUNITY
Start: 2022-08-05 | End: 1900-01-01

## 2024-04-25 RX ORDER — TERAZOSIN 5 MG/1
5 CAPSULE ORAL TWICE DAILY
Qty: 60 | Refills: 2 | Status: ACTIVE | COMMUNITY
Start: 2021-05-07 | End: 1900-01-01

## 2024-06-12 RX ORDER — DARUNAVIR, COBICISTAT, EMTRICITABINE, AND TENOFOVIR ALAFENAMIDE 800; 150; 200; 10 MG/1; MG/1; MG/1; MG/1
800-150-200-10 TABLET, FILM COATED ORAL
Qty: 90 | Refills: 1 | Status: ACTIVE | COMMUNITY
Start: 2022-09-27 | End: 1900-01-01

## 2024-06-12 RX ORDER — DOLUTEGRAVIR SODIUM 50 MG/1
50 TABLET, FILM COATED ORAL
Qty: 90 | Refills: 0 | Status: ACTIVE | COMMUNITY
Start: 2022-09-27 | End: 1900-01-01

## 2024-08-01 ENCOUNTER — APPOINTMENT (OUTPATIENT)
Dept: INFECTIOUS DISEASE | Facility: CLINIC | Age: 69
End: 2024-08-01
Payer: MEDICARE

## 2024-08-01 VITALS
DIASTOLIC BLOOD PRESSURE: 92 MMHG | OXYGEN SATURATION: 96 % | SYSTOLIC BLOOD PRESSURE: 149 MMHG | HEART RATE: 84 BPM | RESPIRATION RATE: 18 BRPM | HEIGHT: 71 IN

## 2024-08-01 DIAGNOSIS — E11.9 TYPE 2 DIABETES MELLITUS W/OUT COMPLICATIONS: ICD-10-CM

## 2024-08-01 DIAGNOSIS — Z01.00 ENCOUNTER FOR EXAMINATION OF EYES AND VISION W/OUT ABNORMAL FINDINGS: ICD-10-CM

## 2024-08-01 DIAGNOSIS — M79.10 MYALGIA, UNSPECIFIED SITE: ICD-10-CM

## 2024-08-01 DIAGNOSIS — B20 HUMAN IMMUNODEFICIENCY VIRUS [HIV] DISEASE: ICD-10-CM

## 2024-08-01 DIAGNOSIS — Z01.20 ENCOUNTER FOR DENTAL EXAMINATION AND CLEANING W/OUT ABNORMAL FINDINGS: ICD-10-CM

## 2024-08-01 DIAGNOSIS — D64.9 ANEMIA, UNSPECIFIED: ICD-10-CM

## 2024-08-01 PROCEDURE — 99214 OFFICE O/P EST MOD 30 MIN: CPT

## 2024-08-01 RX ORDER — DOXYCYCLINE HYCLATE 100 MG/1
100 TABLET ORAL
Qty: 60 | Refills: 0 | Status: ACTIVE | COMMUNITY
Start: 2024-08-01 | End: 1900-01-01

## 2024-08-01 NOTE — REVIEW OF SYSTEMS
[Shortness Of Breath] : shortness of breath [Hesitancy] : urinary hesitancy [Negative] : Heme/Lymph [___ # of Missed Doses in The Past Week] : [unfilled] doses missed in the past week  [FreeTextEntry9] : Muscle aches

## 2024-08-01 NOTE — HISTORY OF PRESENT ILLNESS
[FreeTextEntry1] : HIV diagnosed in  On Symtuza/Tivicay Takes meds at 4-6PM - feels best having meds at 6PM with dinner He works as a , he notices he is more forgetful Has not had a neuropsych testing Has had dyspepsia, took Tums frequently, switched to pepcid for better control due to potential DDI with INSTI  Has had LL viremia, last measure Jan 2024 was < 30 after Rx intensification  Has body aches- thought from statin  Has had EGD and colonoscopy in the last 6 months - was OK  Trouble with voiding  Has high BP  Was on meds for BPH When he took meds- had pins and needles  Stopped when he interrupted treatments [Sexually Active] : The patient is not sexually active

## 2024-08-01 NOTE — ASSESSMENT
[FreeTextEntry1] : HIV on Symtuza/Tivicay Tolerating well, continue current meds. Initially on Isentress and Truvada Had Diarrhea and Vomiting- took 2 years to adapt Feels more awake Oct 2019 changed to Biktarvy Now on Symtuza/Tivicay GI symptoms - terrible heartburn. Using Pepcid Complete with Mg hydroxide and Ca++- uses all the time- 4-5 X a day If VL remains LL- if wants to can stop tivicay, wants to continue- had sensation of numbness when VL up, now less frequent Will try omeprazole, test for H pylori If no improvement on PPI and H pylori negative, could consider Cabenuva direct injection as he cannot tolerate pills Reviewed all past resistance tests including INSTI polymorphisms, no resistance predicted to Biktarvy but had persistent LL viremia. Concern for Cabenuva 1) VL not sustained < 30; 2) Low genetic barrier combination in setting of LL viremia; 3) no PI block so active virus will be released. No Evolution of Clinton despite LL viremia. He wants to try Cabenuva, has difficulty with pills due to frequent diarrhea and sensitive stomach If VL now < 30 or ND- (3 of last 4), and check archive- if stable- could switch Cabenuva. Labs from Feb 2024 reviewed Will check CD4, VL, CBC, SMA, Genosure Likely clonal virus- if get 2 serially suppressed can switch to Cabenuva- he would not like to risk transmission Vaccines: Defers vaccines, due for Hep A, PCV20 and Tdap HTN: on amlodipine Lipids on statin- using every other day, concerned for AEs, check today - 2 hour fast- check CPK if elevated would stop statin and refer to cardiology- Dr Otis Watson. If CPK is normal to take daily Colonoscopy: Had EGD and colonoscopy- had H pylori Ab+, had 1 polyps- no reports but not told of any pathology- repeat in 5 years GERD- uses 20 mg omeprazole Dental: UTD= needs work- had broken molars- extracted Opth: not seeing dentist.

## 2024-08-02 LAB
ALBUMIN SERPL ELPH-MCNC: 4.8 G/DL
ALP BLD-CCNC: 87 U/L
ALT SERPL-CCNC: 13 U/L
ANION GAP SERPL CALC-SCNC: 15 MMOL/L
AST SERPL-CCNC: 15 U/L
BILIRUB SERPL-MCNC: 0.4 MG/DL
BUN SERPL-MCNC: 25 MG/DL
C TRACH RRNA SPEC QL NAA+PROBE: NOT DETECTED
CALCIUM SERPL-MCNC: 9.9 MG/DL
CD3 CELLS # BLD: 1943 CELLS/UL
CD3 CELLS NFR BLD: 83 %
CD3+CD4+ CELLS # BLD: 833 CELLS/UL
CD3+CD4+ CELLS NFR BLD: 36 %
CD3+CD4+ CELLS/CD3+CD8+ CLL SPEC: 0.75 RATIO
CD3+CD8+ CELLS # SPEC: 1106 CELLS/UL
CD3+CD8+ CELLS NFR BLD: 47 %
CHLORIDE SERPL-SCNC: 104 MMOL/L
CHOLEST SERPL-MCNC: 225 MG/DL
CK SERPL-CCNC: 156 U/L
CO2 SERPL-SCNC: 23 MMOL/L
CREAT SERPL-MCNC: 1.49 MG/DL
EGFR: 50 ML/MIN/1.73M2
ESTIMATED AVERAGE GLUCOSE: 134 MG/DL
FOLATE SERPL-MCNC: 5 NG/ML
GLUCOSE SERPL-MCNC: 104 MG/DL
HBA1C MFR BLD HPLC: 6.3 %
HCT VFR BLD CALC: 39.5 %
HDLC SERPL-MCNC: 61 MG/DL
HGB BLD-MCNC: 12.9 G/DL
HIV1 RNA # SERPL NAA+PROBE: 32
HIV1 RNA # SERPL NAA+PROBE: ABNORMAL
IRON SATN MFR SERPL: 28 %
IRON SERPL-MCNC: 98 UG/DL
LDLC SERPL CALC-MCNC: 137 MG/DL
MCHC RBC-ENTMCNC: 30.6 PG
MCHC RBC-ENTMCNC: 32.7 GM/DL
MCV RBC AUTO: 93.8 FL
N GONORRHOEA RRNA SPEC QL NAA+PROBE: NOT DETECTED
NONHDLC SERPL-MCNC: 164 MG/DL
PLATELET # BLD AUTO: 236 K/UL
POTASSIUM SERPL-SCNC: 4.6 MMOL/L
PROT SERPL-MCNC: 7.1 G/DL
RBC # BLD: 4.21 M/UL
RBC # FLD: 13.5 %
RPR SER-TITR: NORMAL
SODIUM SERPL-SCNC: 142 MMOL/L
SOURCE AMPLIFICATION: NORMAL
TIBC SERPL-MCNC: 357 UG/DL
TRIGL SERPL-MCNC: 155 MG/DL
UIBC SERPL-MCNC: 258 UG/DL
VIRAL LOAD INTERP: NORMAL
VIRAL LOAD LOG: 1.51
VIT B12 SERPL-MCNC: 618 PG/ML
WBC # FLD AUTO: 7.22 K/UL

## 2024-08-05 ENCOUNTER — RX RENEWAL (OUTPATIENT)
Age: 69
End: 2024-08-05

## 2024-09-11 ENCOUNTER — RX RENEWAL (OUTPATIENT)
Age: 69
End: 2024-09-11

## 2024-09-12 ENCOUNTER — NON-APPOINTMENT (OUTPATIENT)
Age: 69
End: 2024-09-12

## 2024-09-12 ENCOUNTER — RX RENEWAL (OUTPATIENT)
Age: 69
End: 2024-09-12

## 2024-09-24 ENCOUNTER — RX RENEWAL (OUTPATIENT)
Age: 69
End: 2024-09-24

## 2024-10-10 ENCOUNTER — RX RENEWAL (OUTPATIENT)
Age: 69
End: 2024-10-10

## 2024-11-04 ENCOUNTER — RX RENEWAL (OUTPATIENT)
Age: 69
End: 2024-11-04

## 2024-12-06 ENCOUNTER — NON-APPOINTMENT (OUTPATIENT)
Age: 69
End: 2024-12-06

## 2025-01-07 ENCOUNTER — RX RENEWAL (OUTPATIENT)
Age: 70
End: 2025-01-07

## 2025-02-03 ENCOUNTER — RX RENEWAL (OUTPATIENT)
Age: 70
End: 2025-02-03

## 2025-02-06 ENCOUNTER — APPOINTMENT (OUTPATIENT)
Dept: INFECTIOUS DISEASE | Facility: CLINIC | Age: 70
End: 2025-02-06
Payer: MEDICARE

## 2025-02-06 ENCOUNTER — APPOINTMENT (OUTPATIENT)
Dept: INFECTIOUS DISEASE | Facility: CLINIC | Age: 70
End: 2025-02-06

## 2025-02-06 ENCOUNTER — LABORATORY RESULT (OUTPATIENT)
Age: 70
End: 2025-02-06

## 2025-02-06 VITALS
BODY MASS INDEX: 27.3 KG/M2 | OXYGEN SATURATION: 97 % | TEMPERATURE: 98.2 F | HEART RATE: 108 BPM | DIASTOLIC BLOOD PRESSURE: 82 MMHG | SYSTOLIC BLOOD PRESSURE: 148 MMHG | WEIGHT: 195 LBS | HEIGHT: 71 IN

## 2025-02-06 DIAGNOSIS — Z01.00 ENCOUNTER FOR EXAMINATION OF EYES AND VISION W/OUT ABNORMAL FINDINGS: ICD-10-CM

## 2025-02-06 DIAGNOSIS — B96.89 CHRONIC SINUSITIS, UNSPECIFIED: ICD-10-CM

## 2025-02-06 DIAGNOSIS — N40.1 BENIGN PROSTATIC HYPERPLASIA WITH LOWER URINARY TRACT SYMPMS: ICD-10-CM

## 2025-02-06 DIAGNOSIS — B20 HUMAN IMMUNODEFICIENCY VIRUS [HIV] DISEASE: ICD-10-CM

## 2025-02-06 DIAGNOSIS — J32.9 CHRONIC SINUSITIS, UNSPECIFIED: ICD-10-CM

## 2025-02-06 DIAGNOSIS — Z01.20 ENCOUNTER FOR DENTAL EXAMINATION AND CLEANING W/OUT ABNORMAL FINDINGS: ICD-10-CM

## 2025-02-06 DIAGNOSIS — R35.1 BENIGN PROSTATIC HYPERPLASIA WITH LOWER URINARY TRACT SYMPMS: ICD-10-CM

## 2025-02-06 DIAGNOSIS — E11.9 TYPE 2 DIABETES MELLITUS W/OUT COMPLICATIONS: ICD-10-CM

## 2025-02-06 PROCEDURE — 96372 THER/PROPH/DIAG INJ SC/IM: CPT

## 2025-02-06 PROCEDURE — 99214 OFFICE O/P EST MOD 30 MIN: CPT | Mod: 25

## 2025-02-06 RX ORDER — CABOTEGRAVIR AND RILPIVIRINE 600-900/3
600 & 900 KIT INTRAMUSCULAR
Qty: 1 | Refills: 1 | Status: ACTIVE | COMMUNITY
Start: 2025-02-06

## 2025-02-06 RX ORDER — CABOTEGRAVIR AND RILPIVIRINE 600-900/3
600 & 900 KIT INTRAMUSCULAR
Qty: 1 | Refills: 0 | Status: ACTIVE | COMMUNITY
Start: 2025-02-06 | End: 1900-01-01

## 2025-02-06 RX ORDER — CEFUROXIME AXETIL 500 MG/1
500 TABLET, FILM COATED ORAL
Qty: 14 | Refills: 0 | Status: ACTIVE | COMMUNITY
Start: 2025-02-06 | End: 1900-01-01

## 2025-02-06 RX ORDER — CABOTEGRAVIR AND RILPIVIRINE 600-900/3
600 & 900 KIT INTRAMUSCULAR
Qty: 0 | Refills: 0 | Status: COMPLETED | OUTPATIENT
Start: 2025-02-06

## 2025-02-06 RX ADMIN — CABOTEGRAVIR AND RILPIVIRINE 0 MG/3ML: KIT at 00:00

## 2025-02-07 LAB
ALBUMIN SERPL ELPH-MCNC: 4.3 G/DL
ALP BLD-CCNC: 92 U/L
ALT SERPL-CCNC: 9 U/L
ANION GAP SERPL CALC-SCNC: 11 MMOL/L
APPEARANCE: CLEAR
AST SERPL-CCNC: 11 U/L
BACTERIA: NEGATIVE /HPF
BILIRUB SERPL-MCNC: 0.3 MG/DL
BILIRUBIN URINE: NEGATIVE
BLOOD URINE: NEGATIVE
BUN SERPL-MCNC: 19 MG/DL
C TRACH RRNA SPEC QL NAA+PROBE: NOT DETECTED
CALCIUM OXALATE CRYSTALS: PRESENT
CALCIUM SERPL-MCNC: 9.1 MG/DL
CAST: NORMAL /LPF
CD3 CELLS # BLD: 1508 CELLS/UL
CD3 CELLS NFR BLD: 85 %
CD3+CD4+ CELLS # BLD: 587 CELLS/UL
CD3+CD4+ CELLS NFR BLD: 33 %
CD3+CD4+ CELLS/CD3+CD8+ CLL SPEC: 0.64 RATIO
CD3+CD8+ CELLS # SPEC: 912 CELLS/UL
CD3+CD8+ CELLS NFR BLD: 51 %
CHLORIDE SERPL-SCNC: 102 MMOL/L
CHOLEST SERPL-MCNC: 231 MG/DL
CO2 SERPL-SCNC: 27 MMOL/L
COLOR: YELLOW
CREAT SERPL-MCNC: 1.45 MG/DL
EGFR: 52 ML/MIN/1.73M2
EPITHELIAL CELLS: 1 /HPF
ESTIMATED AVERAGE GLUCOSE: 140 MG/DL
GLUCOSE QUALITATIVE U: NEGATIVE MG/DL
GLUCOSE SERPL-MCNC: 129 MG/DL
HBA1C MFR BLD HPLC: 6.5 %
HCT VFR BLD CALC: 34.9 %
HDLC SERPL-MCNC: 46 MG/DL
HGB BLD-MCNC: 11.5 G/DL
HIV1 RNA # SERPL NAA+PROBE: 42
HIV1 RNA # SERPL NAA+PROBE: ABNORMAL
KETONES URINE: ABNORMAL MG/DL
LDLC SERPL CALC-MCNC: 167 MG/DL
LEUKOCYTE ESTERASE URINE: NEGATIVE
MCHC RBC-ENTMCNC: 29.6 PG
MCHC RBC-ENTMCNC: 33 G/DL
MCV RBC AUTO: 89.7 FL
MICROSCOPIC-UA: NORMAL
MUCUS: PRESENT
N GONORRHOEA RRNA SPEC QL NAA+PROBE: NOT DETECTED
NITRITE URINE: NEGATIVE
NONHDLC SERPL-MCNC: 185 MG/DL
PH URINE: 5.5
PLATELET # BLD AUTO: 274 K/UL
POTASSIUM SERPL-SCNC: 4.2 MMOL/L
PROT SERPL-MCNC: 7.1 G/DL
PROTEIN URINE: 30 MG/DL
RBC # BLD: 3.89 M/UL
RBC # FLD: 14.3 %
RED BLOOD CELLS URINE: 2 /HPF
REVIEW: NORMAL
SODIUM SERPL-SCNC: 141 MMOL/L
SOURCE AMPLIFICATION: NORMAL
SPECIFIC GRAVITY URINE: >1.03
TRIGL SERPL-MCNC: 103 MG/DL
UROBILINOGEN URINE: 0.2 MG/DL
VIRAL LOAD INTERP: NORMAL
VIRAL LOAD LOG: 1.62
WBC # FLD AUTO: 8.24 K/UL
WHITE BLOOD CELLS URINE: 0 /HPF

## 2025-02-08 LAB
HCV AB SER QL: NONREACTIVE
HCV S/CO RATIO: 0.14 S/CO
T PALLIDUM AB SER QL IA: POSITIVE

## 2025-02-11 LAB
M TB IFN-G BLD-IMP: NEGATIVE
QUANTIFERON TB PLUS MITOGEN MINUS NIL: 3.69 IU/ML
QUANTIFERON TB PLUS NIL: 0.03 IU/ML
QUANTIFERON TB PLUS TB1 MINUS NIL: 0 IU/ML
QUANTIFERON TB PLUS TB2 MINUS NIL: 0 IU/ML

## 2025-03-03 ENCOUNTER — NON-APPOINTMENT (OUTPATIENT)
Age: 70
End: 2025-03-03

## 2025-03-04 ENCOUNTER — APPOINTMENT (OUTPATIENT)
Dept: INFECTIOUS DISEASE | Facility: CLINIC | Age: 70
End: 2025-03-04
Payer: MEDICARE

## 2025-03-04 PROCEDURE — 96372 THER/PROPH/DIAG INJ SC/IM: CPT

## 2025-03-05 RX ADMIN — CABOTEGRAVIR AND RILPIVIRINE 0 MG/2ML: KIT at 00:00

## 2025-03-20 RX ORDER — CABOTEGRAVIR AND RILPIVIRINE 400-600/2
400 & 600 KIT INTRAMUSCULAR
Qty: 1 | Refills: 5 | Status: ACTIVE | COMMUNITY
Start: 2025-03-20

## 2025-04-01 ENCOUNTER — APPOINTMENT (OUTPATIENT)
Dept: INFECTIOUS DISEASE | Facility: CLINIC | Age: 70
End: 2025-04-01
Payer: MEDICARE

## 2025-04-01 ENCOUNTER — MED ADMIN CHARGE (OUTPATIENT)
Age: 70
End: 2025-04-01

## 2025-04-01 PROCEDURE — 96372 THER/PROPH/DIAG INJ SC/IM: CPT

## 2025-04-01 RX ADMIN — CABOTEGRAVIR AND RILPIVIRINE 0 MG/2ML: KIT at 00:00

## 2025-04-29 ENCOUNTER — MED ADMIN CHARGE (OUTPATIENT)
Age: 70
End: 2025-04-29

## 2025-04-29 ENCOUNTER — APPOINTMENT (OUTPATIENT)
Dept: INFECTIOUS DISEASE | Facility: CLINIC | Age: 70
End: 2025-04-29
Payer: MEDICARE

## 2025-04-29 PROCEDURE — 96372 THER/PROPH/DIAG INJ SC/IM: CPT

## 2025-04-29 RX ADMIN — CABOTEGRAVIR AND RILPIVIRINE 0 MG/2ML: KIT at 00:00

## 2025-05-27 ENCOUNTER — MED ADMIN CHARGE (OUTPATIENT)
Age: 70
End: 2025-05-27

## 2025-05-27 ENCOUNTER — APPOINTMENT (OUTPATIENT)
Dept: INFECTIOUS DISEASE | Facility: CLINIC | Age: 70
End: 2025-05-27
Payer: MEDICARE

## 2025-05-27 PROCEDURE — 96372 THER/PROPH/DIAG INJ SC/IM: CPT

## 2025-05-28 RX ADMIN — CABOTEGRAVIR AND RILPIVIRINE 0 MG/2ML: KIT at 00:00

## 2025-06-17 ENCOUNTER — APPOINTMENT (OUTPATIENT)
Dept: INFECTIOUS DISEASE | Facility: CLINIC | Age: 70
End: 2025-06-17
Payer: MEDICARE

## 2025-06-17 VITALS
HEIGHT: 71 IN | SYSTOLIC BLOOD PRESSURE: 157 MMHG | BODY MASS INDEX: 27.44 KG/M2 | HEART RATE: 89 BPM | TEMPERATURE: 98.1 F | OXYGEN SATURATION: 96 % | DIASTOLIC BLOOD PRESSURE: 93 MMHG | WEIGHT: 196 LBS

## 2025-06-17 PROBLEM — Z98.890 H/O COLONOSCOPY WITH POLYPECTOMY: Status: RESOLVED | Noted: 2024-02-01 | Resolved: 2025-06-17

## 2025-06-17 PROBLEM — I10 BENIGN ESSENTIAL HYPERTENSION: Status: ACTIVE | Noted: 2025-06-17

## 2025-06-17 PROBLEM — Z01.89 NORMAL ESOPHAGOGASTRODUODENOSCOPY (EGD): Status: RESOLVED | Noted: 2024-02-01 | Resolved: 2025-06-17

## 2025-06-17 PROBLEM — E78.5 HYPERLIPIDEMIA: Status: ACTIVE | Noted: 2025-06-17

## 2025-06-17 PROCEDURE — 96372 THER/PROPH/DIAG INJ SC/IM: CPT

## 2025-06-17 PROCEDURE — 99214 OFFICE O/P EST MOD 30 MIN: CPT | Mod: 25

## 2025-06-17 RX ORDER — METOPROLOL TARTRATE 25 MG/1
25 TABLET ORAL
Qty: 60 | Refills: 2 | Status: ACTIVE | COMMUNITY
Start: 2025-06-17

## 2025-06-18 LAB
CK SERPL-CCNC: 122 U/L
CRP SERPL-MCNC: <3 MG/L
ENDOMYSIUM IGA SER QL: NEGATIVE
ENDOMYSIUM IGA TITR SER: NORMAL
GLIADIN IGA SER QL: 0.2 U/ML
GLIADIN IGG SER QL: <0.4 U/ML
GLIADIN PEPTIDE IGA SER-ACNC: NEGATIVE
GLIADIN PEPTIDE IGG SER-ACNC: NEGATIVE
TTG IGA SER IA-ACNC: <0.5 U/ML
TTG IGA SER-ACNC: NEGATIVE
TTG IGG SER IA-ACNC: <0.8 U/ML
TTG IGG SER IA-ACNC: NEGATIVE

## 2025-06-18 RX ADMIN — CABOTEGRAVIR AND RILPIVIRINE 0 MG/2ML: KIT at 00:00

## 2025-06-19 RX ORDER — ATORVASTATIN CALCIUM 10 MG/1
10 TABLET, FILM COATED ORAL
Qty: 30 | Refills: 4 | Status: ACTIVE | COMMUNITY
Start: 2025-06-19 | End: 1900-01-01

## 2025-06-19 RX ORDER — PITAVASTATIN CALCIUM 2.09 MG/1
2 TABLET, FILM COATED ORAL
Qty: 30 | Refills: 3 | Status: DISCONTINUED | COMMUNITY
Start: 2025-06-17 | End: 2025-06-19

## 2025-07-15 ENCOUNTER — APPOINTMENT (OUTPATIENT)
Dept: INFECTIOUS DISEASE | Facility: CLINIC | Age: 70
End: 2025-07-15
Payer: MEDICARE

## 2025-07-15 PROBLEM — R05.8 COUGH WITH EXPECTORATION: Status: ACTIVE | Noted: 2025-07-15

## 2025-07-15 PROCEDURE — 96372 THER/PROPH/DIAG INJ SC/IM: CPT

## 2025-08-12 ENCOUNTER — APPOINTMENT (OUTPATIENT)
Dept: INFECTIOUS DISEASE | Facility: CLINIC | Age: 70
End: 2025-08-12
Payer: MEDICARE

## 2025-08-12 ENCOUNTER — NON-APPOINTMENT (OUTPATIENT)
Age: 70
End: 2025-08-12

## 2025-08-12 PROCEDURE — 96372 THER/PROPH/DIAG INJ SC/IM: CPT

## 2025-08-13 ENCOUNTER — MED ADMIN CHARGE (OUTPATIENT)
Age: 70
End: 2025-08-13

## 2025-08-13 RX ADMIN — CABOTEGRAVIR AND RILPIVIRINE 0 MG/2ML: KIT at 00:00

## 2025-08-27 ENCOUNTER — RX RENEWAL (OUTPATIENT)
Age: 70
End: 2025-08-27

## 2025-09-09 ENCOUNTER — APPOINTMENT (OUTPATIENT)
Dept: INFECTIOUS DISEASE | Facility: CLINIC | Age: 70
End: 2025-09-09
Payer: MEDICARE

## 2025-09-09 PROCEDURE — 96372 THER/PROPH/DIAG INJ SC/IM: CPT

## 2025-09-10 RX ADMIN — CABOTEGRAVIR AND RILPIVIRINE 0 MG/2ML: KIT at 00:00
